# Patient Record
Sex: FEMALE | Race: WHITE | NOT HISPANIC OR LATINO | Employment: OTHER | URBAN - METROPOLITAN AREA
[De-identification: names, ages, dates, MRNs, and addresses within clinical notes are randomized per-mention and may not be internally consistent; named-entity substitution may affect disease eponyms.]

---

## 2017-01-11 ENCOUNTER — OFFICE VISIT (OUTPATIENT)
Dept: OPHTHALMOLOGY | Facility: CLINIC | Age: 82
End: 2017-01-11
Payer: MEDICARE

## 2017-01-11 DIAGNOSIS — H40.1131 PRIMARY OPEN ANGLE GLAUCOMA OF BOTH EYES, MILD STAGE: Primary | ICD-10-CM

## 2017-01-11 DIAGNOSIS — H50.22 HYPERTROPIA OF LEFT EYE: ICD-10-CM

## 2017-01-11 DIAGNOSIS — H25.13 NUCLEAR SCLEROSIS, BILATERAL: ICD-10-CM

## 2017-01-11 DIAGNOSIS — H50.011 ESOTROPIA OF RIGHT EYE: ICD-10-CM

## 2017-01-11 PROCEDURE — 99999 PR PBB SHADOW E&M-EST. PATIENT-LVL I: CPT | Mod: PBBFAC,,, | Performed by: OPHTHALMOLOGY

## 2017-01-11 PROCEDURE — 92012 INTRM OPH EXAM EST PATIENT: CPT | Mod: S$GLB,,, | Performed by: OPHTHALMOLOGY

## 2017-01-11 NOTE — PROGRESS NOTES
HPI     2 month IOP check.      COAG   NSC OU  DRY EYE   STRABISMUS WITH LASER  H/O RET HOLE OD 3/07  Pt. Sees better at distance with new glasses, but likes older glasses for   near.      Combigan BID OU  LATANOPROST qd OU  PF systane 4-6 times daily OD  Tara 128 ointment at bed OD       Last edited by Virginie Klein on 1/11/2017 11:54 AM.         Assessment /Plan     For exam results, see Encounter Report.      ICD-10-CM ICD-9-CM    1. Primary open angle glaucoma of both eyes, mild stage H40.1131 365.11 IOP Stable GOCT Reviewed today  Will cont current treatment     365.71    2. Nuclear sclerosis, bilateral H25.13 366.16 You were found to have visually significant cataract(s) today and it is our opinion that you would benefit from cataract surgery. However, you have chosen to not have surgery at this time.  If you change your mind or if the symptoms worsen, please give us a call for an additional appointment.      3. Esotropia of right eye H50.00 378.00    4. Hypertropia of left eye H50.22 378.31        Return to clinic in 4 Months Dilation,HVF,SDP

## 2017-05-03 ENCOUNTER — OFFICE VISIT (OUTPATIENT)
Dept: OPHTHALMOLOGY | Facility: CLINIC | Age: 82
End: 2017-05-03
Payer: MEDICARE

## 2017-05-03 DIAGNOSIS — H50.22 HYPERTROPIA OF LEFT EYE: ICD-10-CM

## 2017-05-03 DIAGNOSIS — H40.1131 PRIMARY OPEN ANGLE GLAUCOMA OF BOTH EYES, MILD STAGE: Primary | ICD-10-CM

## 2017-05-03 DIAGNOSIS — H25.13 NUCLEAR SCLEROSIS, BILATERAL: ICD-10-CM

## 2017-05-03 DIAGNOSIS — H50.011 ESOTROPIA OF RIGHT EYE: ICD-10-CM

## 2017-05-03 DIAGNOSIS — H04.123 DRY EYES, BILATERAL: ICD-10-CM

## 2017-05-03 PROCEDURE — 92250 FUNDUS PHOTOGRAPHY W/I&R: CPT | Mod: S$GLB,,, | Performed by: OPHTHALMOLOGY

## 2017-05-03 PROCEDURE — 99999 PR PBB SHADOW E&M-EST. PATIENT-LVL I: CPT | Mod: PBBFAC,,, | Performed by: OPHTHALMOLOGY

## 2017-05-03 PROCEDURE — 92014 COMPRE OPH EXAM EST PT 1/>: CPT | Mod: S$GLB,,, | Performed by: OPHTHALMOLOGY

## 2017-05-03 NOTE — PROGRESS NOTES
HPI     Glaucoma    Additional comments: Combigan bid ou, latanoprost qhs ou           Comments   Patient is here  For a 3 month dilated exam w/ SDP'S, patient states she   rarely misses dosing of drops 98% compliant, patient  got a new glass rx   from Dr. Lee around February and is doing ok .         Last edited by Jonathan Sanches MD on 5/3/2017 10:47 AM. (History)            Assessment /Plan     For exam results, see Encounter Report.      ICD-10-CM ICD-9-CM    1. Primary open angle glaucoma of both eyes, mild stage H40.1131 365.11 Color Fundus Photography - OU - Both Eyes Done today   Doing well - intraocular pressure is within acceptable range relative to target pressure with no evidence of progression.   Continue current treatment.  Reviewed importance of continued compliance with treatment and follow up.        365.71    2. Nuclear sclerosis, bilateral H25.13 366.16 Appears stable- Follow    3. Esotropia of right eye H50.00 378.00 Followed by Dr. Lee    4. 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 Dry eyes, bilateral H04.123 375.15 Stable, Follow        Combigan bid ou, latanoprost qhs ou   RETURN TO CLINIC 4 month IOP, HVF and GOCT

## 2017-09-20 ENCOUNTER — OFFICE VISIT (OUTPATIENT)
Dept: OPHTHALMOLOGY | Facility: CLINIC | Age: 82
End: 2017-09-20
Payer: MEDICARE

## 2017-09-20 DIAGNOSIS — H25.13 NUCLEAR SCLEROSIS, BILATERAL: ICD-10-CM

## 2017-09-20 DIAGNOSIS — H40.1131 PRIMARY OPEN ANGLE GLAUCOMA OF BOTH EYES, MILD STAGE: Primary | ICD-10-CM

## 2017-09-20 DIAGNOSIS — H04.123 DRY EYE SYNDROME, BILATERAL: ICD-10-CM

## 2017-09-20 PROCEDURE — 99999 PR PBB SHADOW E&M-EST. PATIENT-LVL I: CPT | Mod: PBBFAC,,, | Performed by: OPHTHALMOLOGY

## 2017-09-20 PROCEDURE — 92083 EXTENDED VISUAL FIELD XM: CPT | Mod: S$GLB,,, | Performed by: OPHTHALMOLOGY

## 2017-09-20 PROCEDURE — 92012 INTRM OPH EXAM EST PATIENT: CPT | Mod: S$GLB,,, | Performed by: OPHTHALMOLOGY

## 2017-09-20 PROCEDURE — 92133 CPTRZD OPH DX IMG PST SGM ON: CPT | Mod: S$GLB,,, | Performed by: OPHTHALMOLOGY

## 2017-09-20 NOTE — PROGRESS NOTES
HPI     Glaucoma    Additional comments: Combigan BID OU and Latanoprost ou qhs, PF Systane   prn OU and Refresh ointment qhs OU           Comments   4 month glaucoma check (IOP check and review GOCT and HVF)    no eye pain  Patient states that her left eye started tearing occasionally since her   last visit.    COAG   NSC OU  DRY EYE   STRABISMUS WITH LASER  H/O RET HOLE OD 3/07    Combigan BID OU  LATANOPROST qd OU  OU PF systane 4-6 times daily , Refresh ointment at bedtime       Last edited by Safia Valencia on 9/20/2017 10:29 AM. (History)            Assessment /Plan     For exam results, see Encounter Report.      ICD-10-CM ICD-9-CM    1. Primary open angle glaucoma of both eyes, mild stage H40.1131 365.11 Posterior Segment OCT Optic Nerve- Both eyes     365.71 Carrera Visual Field - OU - Extended - Both Eyes    Doing well - intraocular pressure is within acceptable range relative to target pressure with no evidence of progression.   Continue current treatment.  Reviewed importance of continued compliance with treatment and follow up.       GOCT appeared a little worse but all other factors stable   2. Nuclear sclerosis, bilateral H25.13 366.16    3. Dry eye syndrome, bilateral H04.123 375.15    Combigan BID OU  LATANOPROST qd OU  OU PF systane 4-6 times daily     Return to clinic 4 months iop check

## 2018-01-05 DIAGNOSIS — H40.1131 PRIMARY OPEN ANGLE GLAUCOMA OF BOTH EYES, MILD STAGE: ICD-10-CM

## 2018-01-05 RX ORDER — LATANOPROST 50 UG/ML
SOLUTION/ DROPS OPHTHALMIC
Qty: 3 BOTTLE | Refills: 4 | Status: SHIPPED | OUTPATIENT
Start: 2018-01-05 | End: 2019-05-03 | Stop reason: SDUPTHER

## 2018-01-30 ENCOUNTER — OFFICE VISIT (OUTPATIENT)
Dept: OPHTHALMOLOGY | Facility: CLINIC | Age: 83
End: 2018-01-30
Payer: MEDICARE

## 2018-01-30 DIAGNOSIS — H25.13 NUCLEAR SCLEROSIS, BILATERAL: ICD-10-CM

## 2018-01-30 DIAGNOSIS — H50.011 ESOTROPIA OF RIGHT EYE: ICD-10-CM

## 2018-01-30 DIAGNOSIS — H40.1131 PRIMARY OPEN ANGLE GLAUCOMA OF BOTH EYES, MILD STAGE: Primary | ICD-10-CM

## 2018-01-30 DIAGNOSIS — H04.123 DRY EYE SYNDROME, BILATERAL: ICD-10-CM

## 2018-01-30 DIAGNOSIS — H50.22 HYPERTROPIA OF LEFT EYE: ICD-10-CM

## 2018-01-30 PROCEDURE — 92012 INTRM OPH EXAM EST PATIENT: CPT | Mod: S$GLB,,, | Performed by: OPHTHALMOLOGY

## 2018-01-30 PROCEDURE — 99999 PR PBB SHADOW E&M-EST. PATIENT-LVL II: CPT | Mod: PBBFAC,,, | Performed by: OPHTHALMOLOGY

## 2018-01-30 NOTE — PROGRESS NOTES
HPI     4 month IOP check.  No change in vision per pt.  No other complaints.    COAG   NSC OU  DRY EYE   STRABISMUS WITH LASER  H/O RET HOLE OD 3/07  Here for 4 month IOP check.    Combigan BID OU  LATANOPROST qd OU (uses at lunch)  OU PF systane 4-6 times daily , Refresh ointment at bedtime      Last edited by Virginie Klein on 1/30/2018 11:45 AM. (History)            Assessment /Plan     For exam results, see Encounter Report.      ICD-10-CM ICD-9-CM    1. Primary open angle glaucoma of both eyes, mild stage H40.1131 365.11 Doing well - intraocular pressure is within acceptable range relative to target pressure with no evidence of progression.   Continue current treatment.  Reviewed importance of continued compliance with treatment and follow up.        365.71    2. Nuclear sclerosis, bilateral H25.13 366.16   You were found to have an early cataract in your eye(s) today, however the cataract is not affecting your activities of daily living, such as reading and driving.You do not need  surgery at this time. We will recheck your cataract at your next visit. You are welcome to call for an earlier appointment if your vision gets worse.      3. Dry eye syndrome, bilateral H04.123 375.15 Stable on Systane and gel - follow    4. Esotropia of right eye H50.00 378.00 Follow    5. Hypertropia of left eye H50.22 378.31 Follow          Combigan BID OU  LATANOPROST qd OU (uses at lunch)  OU PF systane 4-6 times daily , Refresh ointment at bedtime    RETURN TO CLINIC 4 months DOA and SDP

## 2018-05-22 DIAGNOSIS — H40.1131 PRIMARY OPEN ANGLE GLAUCOMA OF BOTH EYES, MILD STAGE: ICD-10-CM

## 2018-05-22 RX ORDER — BRIMONIDINE TARTRATE AND TIMOLOL MALEATE 2; 5 MG/ML; MG/ML
1 SOLUTION OPHTHALMIC 2 TIMES DAILY
Qty: 5 ML | Refills: 6 | Status: SHIPPED | OUTPATIENT
Start: 2018-05-22 | End: 2020-12-09

## 2018-05-22 NOTE — TELEPHONE ENCOUNTER
Patient upset she never got her refill on Combigan. Did not want us to send to a local pharmacy. Called [facility] to get those refills for her. Stated we no longer provide samples. Patient states she'll just go with out drops. Called [facility] to rush order.

## 2018-05-22 NOTE — TELEPHONE ENCOUNTER
----- Message from Cheyenne Frazier sent at 5/22/2018  8:40 AM CDT -----  Contact: Pt  Please give pt a call at .[phone] (home) pt wouldn't give detail of the call but states that its urgent.

## 2018-06-06 ENCOUNTER — OFFICE VISIT (OUTPATIENT)
Dept: OPHTHALMOLOGY | Facility: CLINIC | Age: 83
End: 2018-06-06
Payer: MEDICARE

## 2018-06-06 DIAGNOSIS — H04.123 DRY EYE SYNDROME, BILATERAL: ICD-10-CM

## 2018-06-06 DIAGNOSIS — H40.1131 PRIMARY OPEN ANGLE GLAUCOMA OF BOTH EYES, MILD STAGE: Primary | ICD-10-CM

## 2018-06-06 DIAGNOSIS — H25.12 NUCLEAR SENILE CATARACT OF LEFT EYE: ICD-10-CM

## 2018-06-06 DIAGNOSIS — H50.011 ESOTROPIA OF RIGHT EYE: ICD-10-CM

## 2018-06-06 DIAGNOSIS — H50.22 HYPERTROPIA OF LEFT EYE: ICD-10-CM

## 2018-06-06 DIAGNOSIS — H25.11 SENILE NUCLEAR SCLEROSIS, RIGHT: ICD-10-CM

## 2018-06-06 PROCEDURE — 92250 FUNDUS PHOTOGRAPHY W/I&R: CPT | Mod: S$GLB,,, | Performed by: OPHTHALMOLOGY

## 2018-06-06 PROCEDURE — 92014 COMPRE OPH EXAM EST PT 1/>: CPT | Mod: S$GLB,,, | Performed by: OPHTHALMOLOGY

## 2018-06-06 PROCEDURE — 99999 PR PBB SHADOW E&M-EST. PATIENT-LVL II: CPT | Mod: PBBFAC,,, | Performed by: OPHTHALMOLOGY

## 2018-06-06 NOTE — PROGRESS NOTES
HPI     Glaucoma    Additional comments: combigan bid ou, latanoprost qhs ou           Comments   Patient returns for a 4 month iop check, dilation and sdp's patient states   her vision is blurry when watching tv but not when driving, patient states   she is 100% compliant with drop usage, patient states that vision is OS    Is getting dim.        COAG   NSC OU  DRY EYE   STRABISMUS WITH LASER  H/O RET HOLE OD 3/07    Combigan BID OU  LATANOPROST qd OU  OU PF systane 4-6 times daily , Refresh ointment at bedtime ( has not been   using )        COAG   NSC OU  DRY EYE   STRABISMUS WITH LASER  H/O RET HOLE OD 3/07    Combigan BID OU  LATANOPROST qd OU  OU PF systane 4-6 times daily , Refresh ointment at bedtime ( has not been   using )       Last edited by WM Chadwick on 6/6/2018  8:45 AM. (History)            Assessment /Plan     For exam results, see Encounter Report.      ICD-10-CM ICD-9-CM    1. Primary open angle glaucoma of both eyes, mild stage H40.1131 365.11 Color Fundus Photography - OU - Both Eyes    Doing well - intraocular pressure is within acceptable range relative to target pressure with no evidence of progression.   Continue current treatment.  Reviewed importance of continued compliance with treatment and follow up.       Consider phaco/ab internal canal OS     365.71    2. Dry eye syndrome, bilateral H04.123 375.15 Well. Continue current treatment    3. Esotropia of right eye H50.00 378.00 Stable. Follow    4. 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 Follow    5. Nuclear senile cataract of left eye H25.12 366.16 Come back for cataract pre-op next visit. Will be block   6. 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 Will do orders next visit.

## 2018-07-11 ENCOUNTER — OFFICE VISIT (OUTPATIENT)
Dept: OPHTHALMOLOGY | Facility: CLINIC | Age: 83
End: 2018-07-11
Payer: MEDICARE

## 2018-07-11 DIAGNOSIS — H25.12 NUCLEAR SENILE CATARACT OF LEFT EYE: ICD-10-CM

## 2018-07-11 DIAGNOSIS — H50.011 ESOTROPIA OF RIGHT EYE: ICD-10-CM

## 2018-07-11 DIAGNOSIS — H25.11 SENILE NUCLEAR SCLEROSIS, RIGHT: ICD-10-CM

## 2018-07-11 DIAGNOSIS — H04.123 DRY EYE SYNDROME, BILATERAL: ICD-10-CM

## 2018-07-11 DIAGNOSIS — H50.22 HYPERTROPIA OF LEFT EYE: ICD-10-CM

## 2018-07-11 DIAGNOSIS — H40.1131 PRIMARY OPEN ANGLE GLAUCOMA OF BOTH EYES, MILD STAGE: Primary | ICD-10-CM

## 2018-07-11 PROCEDURE — 92136 OPHTHALMIC BIOMETRY: CPT | Mod: LT,S$GLB,, | Performed by: OPHTHALMOLOGY

## 2018-07-11 PROCEDURE — 99499 UNLISTED E&M SERVICE: CPT | Mod: S$GLB,,, | Performed by: OPHTHALMOLOGY

## 2018-07-11 PROCEDURE — 99999 PR PBB SHADOW E&M-EST. PATIENT-LVL II: CPT | Mod: PBBFAC,,, | Performed by: OPHTHALMOLOGY

## 2018-07-11 RX ORDER — POLYMYXIN B SULFATE AND TRIMETHOPRIM 1; 10000 MG/ML; [USP'U]/ML
1 SOLUTION OPHTHALMIC EVERY 4 HOURS
Qty: 1 BOTTLE | Refills: 1 | Status: SHIPPED | OUTPATIENT
Start: 2018-07-11 | End: 2018-07-21

## 2018-07-11 RX ORDER — KETOROLAC TROMETHAMINE 5 MG/ML
1 SOLUTION OPHTHALMIC 4 TIMES DAILY
Qty: 1 BOTTLE | Refills: 3 | Status: SHIPPED | OUTPATIENT
Start: 2018-07-11 | End: 2018-07-25

## 2018-07-11 RX ORDER — DIFLUPREDNATE OPHTHALMIC 0.5 MG/ML
1 EMULSION OPHTHALMIC 4 TIMES DAILY
Qty: 1 BOTTLE | Refills: 1 | Status: SHIPPED | OUTPATIENT
Start: 2018-07-11 | End: 2018-08-10

## 2018-07-11 NOTE — PROGRESS NOTES
HPI     Patient returns for a one month ascan,and  Topog, patient needs to see   Esmer.      COAG   NSC OU  DRY EYE   STRABISMUS WITH LASER  H/O RET HOLE OD 3/07    Combigan BID OU  LATANOPROST qd OU  OU PF systane 4-6 times daily , Refresh ointment at bedtime ( has not been   using )          COAG   NSC OU  DRY EYE   STRABISMUS WITH LASER  H/O RET HOLE OD 3/07    Combigan BID OU  LATANOPROST qd OU  OU PF systane 4-6 times daily , Refresh ointment at bedtime ( has not been   using )    Last edited by WM Chadwick on 7/11/2018  9:14 AM. (History)            Assessment /Plan     For exam results, see Encounter Report.      ICD-10-CM ICD-9-CM    1. Primary open angle glaucoma of both eyes, mild stage H40.1131 365.11 Discussed with patient treatment options for glaucoma in conjunction with cataract surgery. Patient is currently treating glaucoma with topical medications and reports significant complaints regarding the tolerability of the medications with respect to cost and irritation. Specifically, the patient complains of redness, irritation, chronic discomfort as well as a financial burden associated with the use of glaucoma medications. Discussed treatment options including ECP, filtering procedures, iStent, canaloplasty, Goniotomy or the continued use of glaucoma medications. Patient desires the undergo Ab- CP in conjunction with cataract surgery in order to reduce dependence of glaucoma medications.           365.71    2. Nuclear senile cataract of left eye H25.12 366.16 Visually Significant Cataract OS > OD  Patient reports decreased vision consistent with the clinical amount of lenticular opacity, which reaches the level of visual significance and affects activities of daily living including reading and glare. Risks, benefits, and alternatives to cataract surgery were discussed.   The pt expressed a desire to proceed with surgery with the potential for some reasonable degree of visual  improvement.    Discussed IOL options and refractive outcomes for this patient.    Phaco left eye Toric  with Ab-Internal Canaloplasty , NO ORA  Block  toric IOL.  Will aim for distance  Referral to [facility] for Ophthalmic [facility]  Prescriptions sent for preoperative medications  Durezol, Polytrim, and Ketorolac  Explained that patient may need glasses after surgery.  Discussed that vision may be limited by retina/ strabismus      KRISTINE with pt that she will still need glasses after sx due to diplopia and wears prisms      3. Senile nuclear sclerosis, right H25.11 366.16 See above    4. Dry eye syndrome, bilateral H04.123 375.15    5. Hypertropia of left eye H50.22 378.31 Follow    6. Esotropia of right eye H50.00 378.00 Follow

## 2018-07-25 ENCOUNTER — TELEPHONE (OUTPATIENT)
Dept: OPHTHALMOLOGY | Facility: CLINIC | Age: 83
End: 2018-07-25

## 2018-07-25 NOTE — TELEPHONE ENCOUNTER
----- Message from Bailey Guzman sent at 7/25/2018  1:30 PM CDT -----  Contact: pt  Please call pt regarding procedure tomorrow, pt states no one call to confirm date and time, please call pt @ [phone]

## 2018-07-25 NOTE — TELEPHONE ENCOUNTER
----- Message from Bailey Guzman sent at 7/25/2018  1:30 PM CDT -----  Contact: pt  Please call pt regarding procedure tomorrow, pt states no one call to confirm date and time, please call pt @ [phone]

## 2018-07-25 NOTE — TELEPHONE ENCOUNTER
----- Message from Fabienne Valdes sent at 7/25/2018  7:42 AM CDT -----  Contact: pt  Patient needs to cancel her surgery because she was not taking the medication prescribed for 14 days, please call her back at [phone]. Thank you

## 2018-07-27 ENCOUNTER — OFFICE VISIT (OUTPATIENT)
Dept: OPHTHALMOLOGY | Facility: CLINIC | Age: 83
End: 2018-07-27
Payer: MEDICARE

## 2018-07-27 DIAGNOSIS — Z98.42 CATARACT EXTRACTION STATUS, LEFT: ICD-10-CM

## 2018-07-27 DIAGNOSIS — Z98.890 POST-OPERATIVE STATE: Primary | ICD-10-CM

## 2018-07-27 DIAGNOSIS — H40.1131 PRIMARY OPEN ANGLE GLAUCOMA OF BOTH EYES, MILD STAGE: ICD-10-CM

## 2018-07-27 PROCEDURE — 99999 PR PBB SHADOW E&M-EST. PATIENT-LVL I: CPT | Mod: PBBFAC,,, | Performed by: OPHTHALMOLOGY

## 2018-07-27 PROCEDURE — 99024 POSTOP FOLLOW-UP VISIT: CPT | Mod: S$GLB,,, | Performed by: OPHTHALMOLOGY

## 2018-07-27 PROCEDURE — 99499 UNLISTED E&M SERVICE: CPT | Mod: ,,, | Performed by: OPHTHALMOLOGY

## 2018-07-27 RX ORDER — KETOROLAC TROMETHAMINE 5 MG/ML
1 SOLUTION OPHTHALMIC 4 TIMES DAILY
COMMUNITY
End: 2018-08-01

## 2018-07-27 RX ORDER — POLYMYXIN B SULFATE AND TRIMETHOPRIM 1; 10000 MG/ML; [USP'U]/ML
1 SOLUTION OPHTHALMIC 4 TIMES DAILY
COMMUNITY
End: 2018-08-22

## 2018-07-27 NOTE — PROGRESS NOTES
HPI     Patient states 0/10 on pain scale.        COAG   PCIOL OS TORIC 07/26/18  DRY EYE   STRABISMUS WITH LASER  H/O RET HOLE OD 3/07    Combigan BID OU  LATANOPROST qd OU  OU PF systane 4-6 times daily , Refresh ointment at bedtime ( has not been   using )      OS DUREZOL QID, KET QID, POLYTRIM QID    Last edited by WM Chadwick on 7/27/2018  1:21 PM. (History)            Assessment /Plan     For exam results, see Encounter Report.      ICD-10-CM ICD-9-CM    1. Post-operative state Z98.890 V45.89    2. Cataract extraction status, left Z98.42 V45.61    3. Primary open angle glaucoma of both eyes, mild stage H40.1131 365.11      365.71        PO Day 1 S/P Phaco/IOL with Ab-internal Cp left eye  Doing well.    Use Durezol QID  Stop Ketorolac qid due to burning.  Polymyxin B 4 x day  Reinstructed in importance of absolute compliance with Post-OP instructions including medications, shield at bedtime, and limitation of activities. Follow up appointments in approximately one and six weeks or call immediately for increased pain, redness or vision loss.

## 2018-08-01 ENCOUNTER — OFFICE VISIT (OUTPATIENT)
Dept: OPHTHALMOLOGY | Facility: CLINIC | Age: 83
End: 2018-08-01
Payer: MEDICARE

## 2018-08-01 DIAGNOSIS — H40.1131 PRIMARY OPEN ANGLE GLAUCOMA OF BOTH EYES, MILD STAGE: ICD-10-CM

## 2018-08-01 DIAGNOSIS — H04.123 DRY EYE SYNDROME, BILATERAL: ICD-10-CM

## 2018-08-01 DIAGNOSIS — Z98.42 CATARACT EXTRACTION STATUS, LEFT: ICD-10-CM

## 2018-08-01 DIAGNOSIS — Z98.890 POST-OPERATIVE STATE: Primary | ICD-10-CM

## 2018-08-01 PROCEDURE — 99999 PR PBB SHADOW E&M-EST. PATIENT-LVL II: CPT | Mod: PBBFAC,,, | Performed by: OPHTHALMOLOGY

## 2018-08-01 PROCEDURE — 99024 POSTOP FOLLOW-UP VISIT: CPT | Mod: S$GLB,,, | Performed by: OPHTHALMOLOGY

## 2018-08-01 RX ORDER — LEVOTHYROXINE SODIUM 25 UG/1
TABLET ORAL
COMMUNITY
Start: 2018-07-17

## 2018-08-22 ENCOUNTER — OFFICE VISIT (OUTPATIENT)
Dept: OPHTHALMOLOGY | Facility: CLINIC | Age: 83
End: 2018-08-22
Payer: MEDICARE

## 2018-08-22 DIAGNOSIS — H50.011 ESOTROPIA OF RIGHT EYE: ICD-10-CM

## 2018-08-22 DIAGNOSIS — H25.11 SENILE NUCLEAR SCLEROSIS, RIGHT: ICD-10-CM

## 2018-08-22 DIAGNOSIS — Z98.42 CATARACT EXTRACTION STATUS, LEFT: Primary | ICD-10-CM

## 2018-08-22 DIAGNOSIS — H50.22 HYPERTROPIA OF LEFT EYE: ICD-10-CM

## 2018-08-22 PROCEDURE — 99024 POSTOP FOLLOW-UP VISIT: CPT | Mod: S$GLB,,, | Performed by: OPHTHALMOLOGY

## 2018-08-22 PROCEDURE — 99999 PR PBB SHADOW E&M-EST. PATIENT-LVL I: CPT | Mod: PBBFAC,,, | Performed by: OPHTHALMOLOGY

## 2018-08-22 RX ORDER — DIFLUPREDNATE OPHTHALMIC 0.5 MG/ML
1 EMULSION OPHTHALMIC 3 TIMES DAILY
COMMUNITY
End: 2019-03-27

## 2018-08-22 NOTE — PROGRESS NOTES
HPI     Patient returns for a 3 week p.o. Vision patient states her vision is   good.  COAG   PCIOL /ab internal CP OS+20.5 SN6AT6 / CDE10.06 / 07/26/18  DRY EYE   STRABISMUS WITH LASER  H/O RET HOLE OD 3/07    Combigan BID OU  LATANOPROST qd OU  OU PF systane 4-6 times daily , Refresh ointment at bedtime ( has not been   using )      OS DUREZOL QID (PATIENT STATES TID IF SHES CUONG)        Last edited by Jonathan Sanches MD on 8/22/2018  2:18 PM. (History)            Assessment /Plan     For exam results, see Encounter Report.      ICD-10-CM ICD-9-CM    1. Cataract extraction status, left Z98.42 V45.61 Doing well   Steroid BID x 8/30 then QD x 9/7 then stop    2. Senile nuclear sclerosis, right H25.11 366.16 Pt desires to follow and defers sx at this time    3. Esotropia of right eye H50.00 378.00 Stable with prism - alna with pt re: surgery and she defers at this time   Saw Dr Lee in the past   Pt wants to follow with prism only at this time and knows that we are limited to improving va based on esotropia/ hypertropia    4. 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

## 2018-09-20 ENCOUNTER — TELEPHONE (OUTPATIENT)
Dept: OPHTHALMOLOGY | Facility: CLINIC | Age: 83
End: 2018-09-20

## 2018-09-20 NOTE — TELEPHONE ENCOUNTER
----- Message from Sherley Valdes sent at 9/20/2018  3:53 PM CDT -----  Contact: pt  The pt request a return call, no additional info given, can be reached at [phone]///thxMW

## 2018-09-25 ENCOUNTER — TELEPHONE (OUTPATIENT)
Dept: OPHTHALMOLOGY | Facility: CLINIC | Age: 83
End: 2018-09-25

## 2018-09-25 NOTE — TELEPHONE ENCOUNTER
Spoke with pt.  Advised that  recommends [employer] as Omega-3 supplement.  She may purchase them at [employer].

## 2018-11-28 ENCOUNTER — OFFICE VISIT (OUTPATIENT)
Dept: OPHTHALMOLOGY | Facility: CLINIC | Age: 83
End: 2018-11-28
Payer: MEDICARE

## 2018-11-28 DIAGNOSIS — H50.22 HYPERTROPIA OF LEFT EYE: ICD-10-CM

## 2018-11-28 DIAGNOSIS — H50.011 ESOTROPIA OF RIGHT EYE: ICD-10-CM

## 2018-11-28 DIAGNOSIS — H40.1131 PRIMARY OPEN ANGLE GLAUCOMA OF BOTH EYES, MILD STAGE: Primary | ICD-10-CM

## 2018-11-28 DIAGNOSIS — H04.123 DRY EYE SYNDROME, BILATERAL: ICD-10-CM

## 2018-11-28 DIAGNOSIS — H25.11 SENILE NUCLEAR SCLEROSIS, RIGHT: ICD-10-CM

## 2018-11-28 DIAGNOSIS — Z98.42 CATARACT EXTRACTION STATUS, LEFT: ICD-10-CM

## 2018-11-28 PROCEDURE — 92012 INTRM OPH EXAM EST PATIENT: CPT | Mod: S$GLB,,, | Performed by: OPHTHALMOLOGY

## 2018-11-28 PROCEDURE — 99999 PR PBB SHADOW E&M-EST. PATIENT-LVL II: CPT | Mod: PBBFAC,,, | Performed by: OPHTHALMOLOGY

## 2018-11-28 NOTE — PROGRESS NOTES
HPI     Glaucoma      Additional comments: 3 month IOP check              Comments     The patient states her eyes are doing well and she denies any ocular   pain.  100% drop compliance    1. COAG   2. PCIOL /ab internal CP OS+20.5 SN6AT6 / CDE10.06 / 07/26/18  3. CATS OD  4. DRY EYE   STRABISMUS WITH LASER  5. 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 Esotropia OD  7. Hypertropia OS    Combigan BID OU  LATANOPROST qd OU  OU PF systane 4-6 times daily , Refresh ointment at bedtime ( has not been   using )            Last edited by Leatha Elliott on 11/28/2018  1:38 PM. (History)            Assessment /Plan     For exam results, see Encounter Report.      ICD-10-CM ICD-9-CM    1. Primary open angle glaucoma of both eyes, mild stage H40.1131 365.11 Doing well - intraocular pressure is within acceptable range relative to target pressure with no evidence of progression.   Continue current treatment.  Reviewed importance of continued compliance with treatment and follow up.        365.71    2. Senile nuclear sclerosis, right H25.11 366.16   You were found to have an early cataract in your eye(s) today, however the cataract is not affecting your activities of daily living, such as reading and driving.You do not need  surgery at this time. We will recheck your cataract at your next visit. You are welcome to call for an earlier appointment if your vision gets worse.      3. Cataract extraction status, left Z98.42 V45.61 Stable.   4. Dry eye syndrome, bilateral H04.123 375.15 Well    5. Hypertropia of left eye H50.22 378.31 Stable, follow    6. Esotropia of right eye H50.00 378.00 Stable, follow      Combigan BID OU  LATANOPROST qd OU  Return to clinic 4 months with HVF, GOCT, and IOP check

## 2019-03-27 ENCOUNTER — OFFICE VISIT (OUTPATIENT)
Dept: OPHTHALMOLOGY | Facility: CLINIC | Age: 84
End: 2019-03-27
Payer: MEDICARE

## 2019-03-27 DIAGNOSIS — H40.1131 PRIMARY OPEN ANGLE GLAUCOMA OF BOTH EYES, MILD STAGE: Primary | ICD-10-CM

## 2019-03-27 DIAGNOSIS — Z98.42 CATARACT EXTRACTION STATUS, LEFT: ICD-10-CM

## 2019-03-27 DIAGNOSIS — H25.11 SENILE NUCLEAR SCLEROSIS, RIGHT: ICD-10-CM

## 2019-03-27 DIAGNOSIS — H50.011 ESOTROPIA OF RIGHT EYE: ICD-10-CM

## 2019-03-27 DIAGNOSIS — H04.123 DRY EYE SYNDROME, BILATERAL: ICD-10-CM

## 2019-03-27 PROCEDURE — 92012 PR EYE EXAM, EST PATIENT,INTERMED: ICD-10-PCS | Mod: S$GLB,,, | Performed by: OPHTHALMOLOGY

## 2019-03-27 PROCEDURE — 92133 POSTERIOR SEGMENT OCT OPTIC NERVE(OCULAR COHERENCE TOMOGRAPHY) - OU - BOTH EYES: ICD-10-PCS | Mod: S$GLB,,, | Performed by: OPHTHALMOLOGY

## 2019-03-27 PROCEDURE — 99999 PR PBB SHADOW E&M-EST. PATIENT-LVL II: CPT | Mod: PBBFAC,,, | Performed by: OPHTHALMOLOGY

## 2019-03-27 PROCEDURE — 92083 EXTENDED VISUAL FIELD XM: CPT | Mod: S$GLB,,, | Performed by: OPHTHALMOLOGY

## 2019-03-27 PROCEDURE — 92012 INTRM OPH EXAM EST PATIENT: CPT | Mod: S$GLB,,, | Performed by: OPHTHALMOLOGY

## 2019-03-27 PROCEDURE — 99999 PR PBB SHADOW E&M-EST. PATIENT-LVL II: ICD-10-PCS | Mod: PBBFAC,,, | Performed by: OPHTHALMOLOGY

## 2019-03-27 PROCEDURE — 92083 HUMPHREY VISUAL FIELD - OU - BOTH EYES: ICD-10-PCS | Mod: S$GLB,,, | Performed by: OPHTHALMOLOGY

## 2019-03-27 PROCEDURE — 92133 CPTRZD OPH DX IMG PST SGM ON: CPT | Mod: S$GLB,,, | Performed by: OPHTHALMOLOGY

## 2019-03-27 RX ORDER — LIOTHYRONINE SODIUM 5 UG/1
25 TABLET ORAL DAILY
COMMUNITY

## 2019-03-27 RX ORDER — KETOROLAC TROMETHAMINE 5 MG/ML
1 SOLUTION OPHTHALMIC 4 TIMES DAILY
Qty: 1 BOTTLE | Refills: 0 | Status: SHIPPED | OUTPATIENT
Start: 2019-03-27 | End: 2019-04-03

## 2019-03-27 NOTE — PROGRESS NOTES
HPI     Patient returns for a 4 month hvf review, goct and iop check,patient   states she misses her drop dosing weekly but cant tell me which one.      PCP DR. WOOD        1. COAG   2. PCIOL /ab internal CP OS+20.5 SN6AT6 / CDE10.06 / 07/26/18  3. CATS OD  4. DRY EYE   STRABISMUS WITH LASER  5. 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 Esotropia OD  7. Hypertropia OS    Combigan BID OU  LATANOPROST qd OU  OU PF systane 4-6 times daily , Refresh ointment at bedtime ( has not been   using )      Last edited by Jonathan Sanches MD on 3/27/2019 11:39 AM. (History)            Assessment /Plan     For exam results, see Encounter Report.      ICD-10-CM ICD-9-CM    1. Primary open angle glaucoma of both eyes, mild stage H40.1131 365.11 Posterior Segment OCT Optic Nerve- Both eyes     365.71 Carrera Visual Field - OU - Extended - Both Eyes      Would like IOP lower , pt defers phaco/ cp now od- will book SLT OD    2. Cataract extraction status, left Z98.42 V45.61    3. Senile nuclear sclerosis, right H25.11 366.16 Follow at pt's reqt   4. Esotropia of right eye H50.00 378.00 Follow    5. Dry eye syndrome, bilateral H04.123 375.15 Follow        Book SLT OD     Combigan BID OU  LATANOPROST qd OU  OU PF systane 4-6 times daily , Refresh ointment at bedtime

## 2019-04-23 ENCOUNTER — OFFICE VISIT (OUTPATIENT)
Dept: OPHTHALMOLOGY | Facility: CLINIC | Age: 84
End: 2019-04-23
Payer: MEDICARE

## 2019-04-23 DIAGNOSIS — H40.1131 PRIMARY OPEN ANGLE GLAUCOMA OF BOTH EYES, MILD STAGE: ICD-10-CM

## 2019-04-23 DIAGNOSIS — Z98.42 CATARACT EXTRACTION STATUS, LEFT: ICD-10-CM

## 2019-04-23 DIAGNOSIS — H04.123 DRY EYES, BILATERAL: Primary | ICD-10-CM

## 2019-04-23 DIAGNOSIS — H25.11 SENILE NUCLEAR SCLEROSIS, RIGHT: ICD-10-CM

## 2019-04-23 PROCEDURE — 92012 INTRM OPH EXAM EST PATIENT: CPT | Mod: S$GLB,,, | Performed by: OPHTHALMOLOGY

## 2019-04-23 PROCEDURE — 99999 PR PBB SHADOW E&M-EST. PATIENT-LVL II: CPT | Mod: PBBFAC,,, | Performed by: OPHTHALMOLOGY

## 2019-04-23 PROCEDURE — 99999 PR PBB SHADOW E&M-EST. PATIENT-LVL II: ICD-10-PCS | Mod: PBBFAC,,, | Performed by: OPHTHALMOLOGY

## 2019-04-23 PROCEDURE — 92012 PR EYE EXAM, EST PATIENT,INTERMED: ICD-10-PCS | Mod: S$GLB,,, | Performed by: OPHTHALMOLOGY

## 2019-04-23 NOTE — PROGRESS NOTES
"HPI     Eye Problem      Additional comments: Eye pain OD              Comments     The patient states at the end of the day starting Saturday she has had   pain in the top part of her right eye "scratching" that hurts worse when   she blinks. The patient states she had to put an eye patch on because it   hurt so bad to blink. The patient rates her pain a 5-6 on a scale of 1-10.    PCP DR. WOOD    1. COAG   SLT OD 4-11-19  2. PCIOL /ab internal CP OS+20.5 SN6AT6 / CDE10.06 / 07/26/18  3. CATS OD  4. DRY EYE   STRABISMUS WITH LASER  5. 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 Esotropia OD  7. Hypertropia OS    Combigan BID OU  LATANOPROST QD OU  OU PF systane 4-6 times daily , Refresh ointment at bedtime          Last edited by Jonathan Sanches MD on 4/23/2019  4:22 PM. (History)            Assessment /Plan     For exam results, see Encounter Report.    ICD-10-CM ICD-9-CM   1. Dry eyes, bilateral     Significant today , start oasis tears  H04.123 375.15   2. Primary open angle glaucoma of both eyes, mild stage- iop deferred today due to severe dryness  H40.1131 365.11     365.71   3. Cataract extraction status, left Z98.42 V45.61   4. Senile nuclear sclerosis, right H25.11 366.16         Add OASIS 4-6 times daily OD   Combigan BID OU  LATANOPROST QD OU  OU PF systane 4-6 times daily , Refresh ointment at bedtime       Cancel appt next week and move to 3-4 weeks , will landon IOP then         "

## 2019-05-03 DIAGNOSIS — H40.1131 PRIMARY OPEN ANGLE GLAUCOMA OF BOTH EYES, MILD STAGE: ICD-10-CM

## 2019-05-03 RX ORDER — LATANOPROST 50 UG/ML
SOLUTION/ DROPS OPHTHALMIC
Qty: 3 BOTTLE | Refills: 4 | Status: SHIPPED | OUTPATIENT
Start: 2019-05-03 | End: 2020-07-02

## 2019-05-21 ENCOUNTER — OFFICE VISIT (OUTPATIENT)
Dept: OPHTHALMOLOGY | Facility: CLINIC | Age: 84
End: 2019-05-21
Payer: MEDICARE

## 2019-05-21 DIAGNOSIS — Z98.42 CATARACT EXTRACTION STATUS, LEFT: ICD-10-CM

## 2019-05-21 DIAGNOSIS — H40.1131 PRIMARY OPEN ANGLE GLAUCOMA OF BOTH EYES, MILD STAGE: Primary | ICD-10-CM

## 2019-05-21 DIAGNOSIS — H25.11 SENILE NUCLEAR SCLEROSIS, RIGHT: ICD-10-CM

## 2019-05-21 DIAGNOSIS — H50.011 ESOTROPIA OF RIGHT EYE: ICD-10-CM

## 2019-05-21 DIAGNOSIS — H04.123 DRY EYES, BILATERAL: ICD-10-CM

## 2019-05-21 PROCEDURE — 92012 PR EYE EXAM, EST PATIENT,INTERMED: ICD-10-PCS | Mod: S$GLB,,, | Performed by: OPHTHALMOLOGY

## 2019-05-21 PROCEDURE — 92012 INTRM OPH EXAM EST PATIENT: CPT | Mod: S$GLB,,, | Performed by: OPHTHALMOLOGY

## 2019-05-21 PROCEDURE — 99999 PR PBB SHADOW E&M-EST. PATIENT-LVL I: CPT | Mod: PBBFAC,,, | Performed by: OPHTHALMOLOGY

## 2019-05-21 PROCEDURE — 99999 PR PBB SHADOW E&M-EST. PATIENT-LVL I: ICD-10-PCS | Mod: PBBFAC,,, | Performed by: OPHTHALMOLOGY

## 2019-05-21 NOTE — PROGRESS NOTES
HPI     Glaucoma      Additional comments: IOP check, Combgian BID OU, Latanoprost QHS OU              Dry Eye      Additional comments: Oasis 4-5x daily OU              Comments     Pt states the Oasis took about 3-4 days to take effect but doing really   well with her dryness. Using her glaucoma drops as directed.     PCP DR. WOOD        1. COAG   SLT OD 4-11-19  2. PCIOL /ab internal CP OS+20.5 SN6AT6 / CDE10.06 / 07/26/18  3. CATS OD  4. DRY EYE   STRABISMUS WITH LASER  5. 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 Esotropia OD  7. Hypertropia OS    Combigan BID OU  LATANOPROST qd OU  OU PF systane 4-6 times daily , Refresh ointment at bedtime, Oasis 4-6x   daily OD          Last edited by Jakob Delong on 5/21/2019  2:13 PM. (History)            Assessment /Plan     For exam results, see Encounter Report.      ICD-10-CM ICD-9-CM    1. Primary open angle glaucoma of both eyes, mild stage H40.1131 365.11 Doing well - intraocular pressure is within acceptable range relative to target pressure with no evidence of progression.   Continue current treatment.  Reviewed importance of continued compliance with treatment and follow up.        365.71    2. Cataract extraction status, left Z98.42 V45.61 Follow    3. Senile nuclear sclerosis, right H25.11 366.16 Follow    4. Dry eyes, bilateral H04.123 375.15 Much improved today    5. Esotropia of right eye H50.00 378.00        RETURN TO CLINIC 4 months DOA, SDP    Combigan BID OU  LATANOPROST qd OU  OU PF systane 4-6 times daily , Refresh ointment at bedtime, Oasis 4-6x   daily OD

## 2019-09-25 ENCOUNTER — OFFICE VISIT (OUTPATIENT)
Dept: OPHTHALMOLOGY | Facility: CLINIC | Age: 84
End: 2019-09-25
Payer: MEDICARE

## 2019-09-25 DIAGNOSIS — Z98.42 CATARACT EXTRACTION STATUS, LEFT: ICD-10-CM

## 2019-09-25 DIAGNOSIS — H40.1132 PRIMARY OPEN ANGLE GLAUCOMA (POAG) OF BOTH EYES, MODERATE STAGE: Primary | ICD-10-CM

## 2019-09-25 DIAGNOSIS — H50.22 HYPERTROPIA OF LEFT EYE: ICD-10-CM

## 2019-09-25 DIAGNOSIS — H25.11 SENILE NUCLEAR SCLEROSIS, RIGHT: ICD-10-CM

## 2019-09-25 DIAGNOSIS — H04.123 DRY EYES, BILATERAL: ICD-10-CM

## 2019-09-25 DIAGNOSIS — H50.011 ESOTROPIA OF RIGHT EYE: ICD-10-CM

## 2019-09-25 PROCEDURE — 99999 PR PBB SHADOW E&M-EST. PATIENT-LVL II: CPT | Mod: PBBFAC,,, | Performed by: OPHTHALMOLOGY

## 2019-09-25 PROCEDURE — 92250 COLOR FUNDUS PHOTOGRAPHY - OU - BOTH EYES: ICD-10-PCS | Mod: S$GLB,,, | Performed by: OPHTHALMOLOGY

## 2019-09-25 PROCEDURE — 92014 PR EYE EXAM, EST PATIENT,COMPREHESV: ICD-10-PCS | Mod: S$GLB,,, | Performed by: OPHTHALMOLOGY

## 2019-09-25 PROCEDURE — 99999 PR PBB SHADOW E&M-EST. PATIENT-LVL II: ICD-10-PCS | Mod: PBBFAC,,, | Performed by: OPHTHALMOLOGY

## 2019-09-25 PROCEDURE — 92250 FUNDUS PHOTOGRAPHY W/I&R: CPT | Mod: S$GLB,,, | Performed by: OPHTHALMOLOGY

## 2019-09-25 PROCEDURE — 92014 COMPRE OPH EXAM EST PT 1/>: CPT | Mod: S$GLB,,, | Performed by: OPHTHALMOLOGY

## 2019-09-25 NOTE — PROGRESS NOTES
HPI     Glaucoma      Additional comments: Combigan bid ou, Latanoprost qhs ou              Comments     Patient returns for a 4 month iop check, dilation w/ sdp. Patient states   she is 100% compliant with drop usage.        PCP DR. WOOD        1. COAG   SLT OD 4-11-19  2. PCIOL /ab internal CP OS+20.5 SN6AT6 / CDE10.06 / 07/26/18  CATS OD  3. DRY EYE   4. STRABISMUS WITH LASER  5. 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 Esotropia OD  7. Hypertropia OS    Combigan BID OU  LATANOPROST qd OU  OU PF systane 4-6 times daily , Refresh ointment at bedtime, Oasis 4-6x   daily OD          Last edited by Jonathan Sanches MD on 9/25/2019  4:41 PM. (History)            Assessment /Plan     For exam results, see Encounter Report.      ICD-10-CM ICD-9-CM    1. Primary open angle glaucoma (POAG) of both eyes, moderate stage H40.1132 365.11 Color Fundus Photography - OU - Both Eyes   question of increased thinning out temp rim OU     365.72    2. Cataract extraction status, left Z98.42 V45.61    3. Senile nuclear sclerosis, right H25.11 366.16 Desires to follow   4. Dry eyes, bilateral H04.123 375.15 improved   5. Esotropia of right eye H50.00 378.00 diplopia controlled in glasses    6. 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

## 2019-10-09 ENCOUNTER — OFFICE VISIT (OUTPATIENT)
Dept: OPHTHALMOLOGY | Facility: CLINIC | Age: 84
End: 2019-10-09
Payer: MEDICARE

## 2019-10-09 DIAGNOSIS — H25.11 SENILE NUCLEAR SCLEROSIS, RIGHT: ICD-10-CM

## 2019-10-09 DIAGNOSIS — Z98.42 CATARACT EXTRACTION STATUS, LEFT: ICD-10-CM

## 2019-10-09 DIAGNOSIS — H04.123 DRY EYES, BILATERAL: ICD-10-CM

## 2019-10-09 DIAGNOSIS — H40.1132 PRIMARY OPEN ANGLE GLAUCOMA (POAG) OF BOTH EYES, MODERATE STAGE: Primary | ICD-10-CM

## 2019-10-09 PROCEDURE — 99999 PR PBB SHADOW E&M-EST. PATIENT-LVL II: CPT | Mod: PBBFAC,,, | Performed by: OPHTHALMOLOGY

## 2019-10-09 PROCEDURE — 92012 PR EYE EXAM, EST PATIENT,INTERMED: ICD-10-PCS | Mod: S$GLB,,, | Performed by: OPHTHALMOLOGY

## 2019-10-09 PROCEDURE — 92012 INTRM OPH EXAM EST PATIENT: CPT | Mod: S$GLB,,, | Performed by: OPHTHALMOLOGY

## 2019-10-09 PROCEDURE — 99999 PR PBB SHADOW E&M-EST. PATIENT-LVL II: ICD-10-PCS | Mod: PBBFAC,,, | Performed by: OPHTHALMOLOGY

## 2019-10-09 RX ORDER — TRAZODONE HYDROCHLORIDE 150 MG/1
TABLET ORAL
COMMUNITY
Start: 2019-05-24

## 2019-10-09 NOTE — PROGRESS NOTES
"HPI     Glaucoma      Additional comments: 2 Week IOP Check              Comments     Patient feels OD "feels irritated" prior to starting the new drop, having   a constant FB sensation way at the top of OD and symptoms worsen while   blinking, since starting Rhopressa OU appears red constantly but states it   is nothing she can't get over.        1. COAG   SLT OD 4-11-19  2. PCIOL /ab internal CP OS+20.5 SN6AT6 / CDE10.06 / 07/26/18  CATS OD  3. DRY EYE   4. STRABISMUS WITH LASER  5. 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 Esotropia OD  7. Hypertropia OS    Combigan BID OU  LATANOPROST qd OU  Rhopressa QD OU  OU PF systane 4-6 times daily , Refresh ointment at bedtime, Oasis 4-6x   daily OD          Last edited by Erika Mcfarland, Patient Care Assistant on 10/9/2019  8:24   AM. (History)            Assessment /Plan     For exam results, see Encounter Report.      ICD-10-CM ICD-9-CM    1. Primary open angle glaucoma (POAG) of both eyes, moderate stage H40.1132 365.11 Will continue rhopressa, sent to  pharmacy today for PA      365.72    2. Cataract extraction status, left Z98.42 V45.61 Well    3. Senile nuclear sclerosis, right H25.11 366.16   You were found to have an early cataract in your eye(s) today, however the cataract is not affecting your activities of daily living, such as reading and driving.You do not need  surgery at this time. We will recheck your cataract at your next visit. You are welcome to call for an earlier appointment if your vision gets worse.      4. Dry eyes, bilateral H04.123 375.15 Well, continue current treatment      Combigan BID OU  LATANOPROST qd OU  Rhopressa QD OU       Return to clinic 2-3 months with IOP check               "

## 2019-10-16 ENCOUNTER — TELEPHONE (OUTPATIENT)
Dept: PHARMACY | Facility: CLINIC | Age: 84
End: 2019-10-16

## 2019-10-16 NOTE — TELEPHONE ENCOUNTER
Spoke w/ Pt notifying her Rhopressa PA approved resulting in $90.00 copayment for 3 bottles, her request.    Pt would like medication shipped to her home.  Shipping address verified, billing information obtained.    PA Information:  [employer]  [phone]  PA Case ID: [id number]   PA Approval Dates: 10/16/19-12/31/2020  PA approval dates: 10/16/2019-12/31/2020  PA Approved for Rhopressa  0.02% #2.5mL per 20 days    Thank You!   Hilary Garrido CPhT, B.A  Patient Care Advocate   [facility]  [email or web address]  Phone: [phone] Ext [phone]  Fax: [phone]

## 2020-01-06 ENCOUNTER — TELEPHONE (OUTPATIENT)
Dept: OPHTHALMOLOGY | Facility: CLINIC | Age: 85
End: 2020-01-06

## 2020-01-06 NOTE — TELEPHONE ENCOUNTER
----- Message from Fabienne Valdes sent at 1/6/2020  9:06 AM CST -----  Contact: pt  Patient needs call back rg medication pls advise ..[phone] (home

## 2020-01-06 NOTE — TELEPHONE ENCOUNTER
Spoke with pt.  She is still having pinkness in both eyes since starting Rhopressa.  Recently had blood red eye OD.  She states she was told by Dr. Sanches that the redness would resolve in 2-3 weeks.  She isn't happy with the pink appearance of her eyes.  Will discuss with Dr. Sanches and call pt back.  Her next appointment with him is next month.

## 2020-01-10 ENCOUNTER — OFFICE VISIT (OUTPATIENT)
Dept: OPHTHALMOLOGY | Facility: CLINIC | Age: 85
End: 2020-01-10
Payer: MEDICARE

## 2020-01-10 DIAGNOSIS — Z98.42 CATARACT EXTRACTION STATUS, LEFT: ICD-10-CM

## 2020-01-10 DIAGNOSIS — H40.1132 PRIMARY OPEN ANGLE GLAUCOMA (POAG) OF BOTH EYES, MODERATE STAGE: Primary | ICD-10-CM

## 2020-01-10 DIAGNOSIS — H25.11 SENILE NUCLEAR SCLEROSIS, RIGHT: ICD-10-CM

## 2020-01-10 DIAGNOSIS — H04.123 DRY EYES, BILATERAL: ICD-10-CM

## 2020-01-10 PROCEDURE — 92012 INTRM OPH EXAM EST PATIENT: CPT | Mod: S$GLB,,, | Performed by: OPHTHALMOLOGY

## 2020-01-10 PROCEDURE — 92012 PR EYE EXAM, EST PATIENT,INTERMED: ICD-10-PCS | Mod: S$GLB,,, | Performed by: OPHTHALMOLOGY

## 2020-01-10 PROCEDURE — 99999 PR PBB SHADOW E&M-EST. PATIENT-LVL II: CPT | Mod: PBBFAC,,, | Performed by: OPHTHALMOLOGY

## 2020-01-10 PROCEDURE — 99999 PR PBB SHADOW E&M-EST. PATIENT-LVL II: ICD-10-PCS | Mod: PBBFAC,,, | Performed by: OPHTHALMOLOGY

## 2020-01-10 RX ORDER — PANTOPRAZOLE SODIUM 40 MG/1
TABLET, DELAYED RELEASE ORAL
COMMUNITY
Start: 2019-12-10

## 2020-01-10 NOTE — PROGRESS NOTES
HPI     Glaucoma      Additional comments: 2-3 mth IOP check               Comments     1. COAG   SLT OD 4-11-19  2. PCIOL /ab internal CP OS+20.5 SN6AT6 / CDE10.06 / 07/26/18  CATS OD  3. DRY EYE   4. STRABISMUS WITH LASER  5. 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 Esotropia OD  7. Hypertropia OS    Combigan BID OU  LATANOPROST qd OU  Rhopressa QD OU (pt d/c due to excess redness, inside of lids got red,   never cleared)  OU PF systane 4-6 times daily , Refresh ointment at bedtime, Oasis 4-6x   daily OD          Last edited by Brigid Wise MA on 1/10/2020  8:11 AM. (History)            Assessment /Plan     For exam results, see Encounter Report.      ICD-10-CM ICD-9-CM    1. Primary open angle glaucoma (POAG) of both eyes, moderate stage H40.1132 365.11 Good IOP OS, borderline IOP OD. Consider phaco OD with ab internal CP. Will return for ascan and dilation      365.72    2. Senile nuclear sclerosis, right H25.11 366.16 Pt desires to wait another month before surgery    3. Cataract extraction status, left Z98.42 V45.61    4. Dry eyes, bilateral H04.123 375.15 Well, continue current drops      Combigan BID OU  LATANOPROST qd OU  Return to clinic 2 weeks for ascan, dilation, preop dilation for phaco/ab internal CP OD

## 2020-01-22 ENCOUNTER — OFFICE VISIT (OUTPATIENT)
Dept: OPHTHALMOLOGY | Facility: CLINIC | Age: 85
End: 2020-01-22
Payer: MEDICARE

## 2020-01-22 DIAGNOSIS — H25.11 SENILE NUCLEAR SCLEROSIS, RIGHT: Primary | ICD-10-CM

## 2020-01-22 DIAGNOSIS — H50.011 ESOTROPIA OF RIGHT EYE: ICD-10-CM

## 2020-01-22 DIAGNOSIS — H40.1132 PRIMARY OPEN ANGLE GLAUCOMA (POAG) OF BOTH EYES, MODERATE STAGE: ICD-10-CM

## 2020-01-22 DIAGNOSIS — Z98.42 CATARACT EXTRACTION STATUS, LEFT: ICD-10-CM

## 2020-01-22 DIAGNOSIS — H50.22 HYPERTROPIA OF LEFT EYE: ICD-10-CM

## 2020-01-22 DIAGNOSIS — H04.123 DRY EYE SYNDROME, BILATERAL: ICD-10-CM

## 2020-01-22 PROCEDURE — 92014 COMPRE OPH EXAM EST PT 1/>: CPT | Mod: S$GLB,,, | Performed by: OPHTHALMOLOGY

## 2020-01-22 PROCEDURE — 99999 PR PBB SHADOW E&M-EST. PATIENT-LVL III: ICD-10-PCS | Mod: PBBFAC,,, | Performed by: OPHTHALMOLOGY

## 2020-01-22 PROCEDURE — 92136 OPHTHALMIC BIOMETRY: CPT | Mod: 26,RT,S$GLB, | Performed by: OPHTHALMOLOGY

## 2020-01-22 PROCEDURE — 92136 IOL MASTER - OD - RIGHT EYE: ICD-10-PCS | Mod: 26,RT,S$GLB, | Performed by: OPHTHALMOLOGY

## 2020-01-22 PROCEDURE — 99999 PR PBB SHADOW E&M-EST. PATIENT-LVL III: CPT | Mod: PBBFAC,,, | Performed by: OPHTHALMOLOGY

## 2020-01-22 PROCEDURE — 92014 PR EYE EXAM, EST PATIENT,COMPREHESV: ICD-10-PCS | Mod: S$GLB,,, | Performed by: OPHTHALMOLOGY

## 2020-01-22 RX ORDER — KETOROLAC TROMETHAMINE 5 MG/ML
1 SOLUTION OPHTHALMIC 4 TIMES DAILY
Qty: 5 ML | Refills: 0 | Status: SHIPPED | OUTPATIENT
Start: 2020-01-22 | End: 2021-01-21

## 2020-01-22 RX ORDER — DIFLUPREDNATE OPHTHALMIC 0.5 MG/ML
1 EMULSION OPHTHALMIC 4 TIMES DAILY
Qty: 1 BOTTLE | Refills: 1 | Status: SHIPPED | OUTPATIENT
Start: 2020-01-22 | End: 2020-02-21

## 2020-01-22 RX ORDER — POLYMYXIN B SULFATE AND TRIMETHOPRIM 1; 10000 MG/ML; [USP'U]/ML
1 SOLUTION OPHTHALMIC EVERY 4 HOURS
Qty: 1 BOTTLE | Refills: 1 | Status: SHIPPED | OUTPATIENT
Start: 2020-01-22 | End: 2020-01-27

## 2020-01-22 NOTE — PROGRESS NOTES
HPI     Cataract      Additional comments: Catasract evaluation              Comments     Patient feels OU is doing well, VA appears to be dull and cloudy in OD   compared to OS along with sensitivity to glare in bright sunlight.    PCP DR. WOOD        1. COAG   SLT OD 4-11-19  2. PCIOL /ab internal CP OS+20.5 SN6AT6 / CDE10.06 / 07/26/18  CATS OD  3. DRY EYE   4. STRABISMUS WITH LASER  5. 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 Esotropia OD  7. Hypertropia OS    Combigan BID OU  LATANOPROST qd OU  Rhopressa QD OU (pt d/c due to excess redness, inside of lids got red,   never cleared)  OU PF systane 4-6 times daily , Refresh ointment at bedtime, Oasis 4-6x   daily OD          Last edited by Erika Mcfarland, Patient Care Assistant on 1/22/2020  8:28   AM. (History)            Assessment /Plan     For exam results, see Encounter Report.      ICD-10-CM ICD-9-CM    1. Senile nuclear sclerosis, right H25.11 366.16 Visually Significant Cataract OD  Patient reports decreased vision consistent with the clinical amount of lenticular opacity, which reaches the level of visual significance and affects activities of daily living including reading and glare. Risks, benefits, and alternatives to cataract surgery were discussed.   The pt expressed a desire to proceed with surgery with the potential for some reasonable degree of visual improvement.    Discussed IOL options and refractive outcomes for this patient.    Phaco right eye with Goniotomy TORIC   BLOCK   monofocal IOL.  Will aim for distance  Referral to [facility] for Ophthalmic surgery  Prescriptions sent for preoperative medications  Durezol, polytrim and ketorolac  Explained that patient may need glasses after surgery.  Discussed that vision may be limited by retina, h/o hole od,           2. Primary open angle glaucoma (POAG) of both eyes, moderate stage H40.1132 365.11 Uncontrolled glaucoma OD on maximum tolerated therapy: Significant risk of continued irreversible  glaucomatous vision loss with current level of treatment. Therefore surgical options were reviewed at great length with the pt and GONIOTOMY  recommended in hopes of reducing the IOP to a more tolerable level. A lengthy discussion of the risks, benefits and alternatives was presented to the pt including balancing the immediate risks of vision loss from surgery vs the likelihood of continued vision loss from the inadequately controlled glaucoma. Also discussed the need for frequent, careful follow-up exams for monitoring and other possible postop adjustments.        365.72    3. Cataract extraction status, left Z98.42 V45.61    4. Esotropia of right eye H50.00 378.00    5. Hypertropia of left eye H50.22 378.31 KRISTINE with pt that she will still need prism after sx    6. Dry eye syndrome, bilateral H04.123 375.15 continue tears ou        H/o Retina Hole OD w/ laser repair , well sealed and wnl moct done today     Combigan BID OU  LATANOPROST qd OU

## 2020-01-30 ENCOUNTER — OUTSIDE PLACE OF SERVICE (OUTPATIENT)
Dept: ADMINISTRATIVE | Facility: OTHER | Age: 85
End: 2020-01-30

## 2020-01-30 ENCOUNTER — OFFICE VISIT (OUTPATIENT)
Dept: OPHTHALMOLOGY | Facility: CLINIC | Age: 85
End: 2020-01-30
Payer: MEDICARE

## 2020-01-30 DIAGNOSIS — Z98.41 CATARACT EXTRACTION STATUS, RIGHT: ICD-10-CM

## 2020-01-30 DIAGNOSIS — H40.1132 PRIMARY OPEN ANGLE GLAUCOMA (POAG) OF BOTH EYES, MODERATE STAGE: ICD-10-CM

## 2020-01-30 DIAGNOSIS — Z98.890 POST-OPERATIVE STATE: Primary | ICD-10-CM

## 2020-01-30 PROCEDURE — 99999 PR PBB SHADOW E&M-EST. PATIENT-LVL II: CPT | Mod: PBBFAC,,, | Performed by: OPHTHALMOLOGY

## 2020-01-30 PROCEDURE — 99499 PR MULTI-FOCAL IOL EVAL: ICD-10-PCS | Mod: CSM,RT,, | Performed by: OPHTHALMOLOGY

## 2020-01-30 PROCEDURE — 65820 PR RELIEVE INNER EYE PRESSURE: ICD-10-PCS | Mod: RT,,, | Performed by: OPHTHALMOLOGY

## 2020-01-30 PROCEDURE — 99024 PR POST-OP FOLLOW-UP VISIT: ICD-10-PCS | Mod: S$GLB,,, | Performed by: OPHTHALMOLOGY

## 2020-01-30 PROCEDURE — 99999 PR PBB SHADOW E&M-EST. PATIENT-LVL II: ICD-10-PCS | Mod: PBBFAC,,, | Performed by: OPHTHALMOLOGY

## 2020-01-30 PROCEDURE — 66984 XCAPSL CTRC RMVL W/O ECP: CPT | Mod: 51,RT,, | Performed by: OPHTHALMOLOGY

## 2020-01-30 PROCEDURE — 66984 PR REMOVAL, CATARACT, W/INSRT INTRAOC LENS, W/O ENDO CYCLO: ICD-10-PCS | Mod: 51,RT,, | Performed by: OPHTHALMOLOGY

## 2020-01-30 PROCEDURE — 65820 GONIOTOMY: CPT | Mod: RT,,, | Performed by: OPHTHALMOLOGY

## 2020-01-30 PROCEDURE — 99024 POSTOP FOLLOW-UP VISIT: CPT | Mod: S$GLB,,, | Performed by: OPHTHALMOLOGY

## 2020-01-30 PROCEDURE — 99499 UNLISTED E&M SERVICE: CPT | Mod: CSM,RT,, | Performed by: OPHTHALMOLOGY

## 2020-01-30 NOTE — PROGRESS NOTES
"HPI     Post-op Evaluation      Additional comments: same day Recheck IOL OD/ Goniotomy/  Toric / Block              Comments     Pt states that OD is "terribly uncomfortable"      Combigan BID OU  LATANOPROST qd OU  Rhopressa QD OU (pt d/c due to excess redness, inside of lids got red,   never cleared)  OU PF systane 4-6 times daily , Refresh ointment at bedtime, Oasis 4-6x   daily OD      PCP DR. MAIN        1. COAG   SLT OD 4-11-19  2. PCIOL /ab internal CP OS+20.5 SN6AT6 / CDE10.06 / 07/26/18      PCIOL / GONIOTOMY/ TORIC/ BLOCK OD 1/30/2020    3. DRY EYE   4. STRABISMUS WITH LASER  5. 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 Esotropia OD  7. Hypertropia OS    Combigan BID OU  LATANOPROST qd OU  Rhopressa QD OU (pt d/c due to excess redness, inside of lids got red,   never cleared)  OU PF systane 4-6 times daily , Refresh ointment at bedtime, Oasis 4-6x   daily OD          Last edited by Nelly Allison on 1/30/2020  1:12 PM. (History)            Assessment /Plan     For exam results, see Encounter Report.      ICD-10-CM ICD-9-CM    1. Post-operative state Z98.890 V45.89    2. Cataract extraction status, right Z98.41 V45.61    3. Primary open angle glaucoma (POAG) of both eyes, moderate stage H40.1132 365.11      365.72        PO Same Day 1 S/P Phaco/IOL with goniotomy  right eye TORIC  Doing well.    Use Durezol , POLY, KETO QID OD  NO GLAUCOMA MEDS OD    Reinstructed in importance of absolute compliance with Post-OP instructions including medications, shield at bedtime, and limitation of activities. Follow up appointments in approximately one and six weeks or call immediately for increased pain, redness or vision loss.                       "

## 2020-02-03 ENCOUNTER — OFFICE VISIT (OUTPATIENT)
Dept: OPHTHALMOLOGY | Facility: CLINIC | Age: 85
End: 2020-02-03
Payer: MEDICARE

## 2020-02-03 DIAGNOSIS — Z98.890 POST-OPERATIVE STATE: Primary | ICD-10-CM

## 2020-02-03 DIAGNOSIS — H40.1132 PRIMARY OPEN ANGLE GLAUCOMA (POAG) OF BOTH EYES, MODERATE STAGE: ICD-10-CM

## 2020-02-03 DIAGNOSIS — Z98.41 CATARACT EXTRACTION STATUS, RIGHT: ICD-10-CM

## 2020-02-03 PROCEDURE — 99024 POSTOP FOLLOW-UP VISIT: CPT | Mod: S$GLB,,, | Performed by: OPHTHALMOLOGY

## 2020-02-03 PROCEDURE — 99999 PR PBB SHADOW E&M-EST. PATIENT-LVL I: CPT | Mod: PBBFAC,,, | Performed by: OPHTHALMOLOGY

## 2020-02-03 PROCEDURE — 99024 PR POST-OP FOLLOW-UP VISIT: ICD-10-PCS | Mod: S$GLB,,, | Performed by: OPHTHALMOLOGY

## 2020-02-03 PROCEDURE — 99999 PR PBB SHADOW E&M-EST. PATIENT-LVL I: ICD-10-PCS | Mod: PBBFAC,,, | Performed by: OPHTHALMOLOGY

## 2020-02-03 RX ORDER — ACETAZOLAMIDE 500 MG/1
500 CAPSULE, EXTENDED RELEASE ORAL 2 TIMES DAILY
Qty: 6 CAPSULE | Refills: 0 | Status: SHIPPED | OUTPATIENT
Start: 2020-02-03 | End: 2020-02-07 | Stop reason: SDUPTHER

## 2020-02-03 NOTE — PROGRESS NOTES
HPI     The patient is here for a 4day PO for PCIOL/ Goniotomy OD. The patient   states her right eye is not doing well as it is blurry and it does ache at   times.    PCP DR. WOOD      1. COAG   SLT OD 4-11-19  2. PCIOL /ab internal CP OS+20.5 SN6AT6 / CDE10.06 / 07/26/18  PCIOL / Goniotomy OD +19.5 SN6AT6 /CDE 10.07/1/30/2020  3. DRY EYE   4. STRABISMUS WITH LASER  5. 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 Esotropia OD    *Intolerant to Rhopressa    OS- Combigan BID, LATANOPROST QD  OD- Durezol QID, Keto QID, Poly QID  OU PF systane 4-6 times daily , Refresh ointment at bedtime, Oasis 4-6x   daily OD    Last edited by Leatha Elliott on 2/3/2020 10:10 AM. (History)            Assessment /Plan     For exam results, see Encounter Report.      ICD-10-CM ICD-9-CM    1. Post-operative state Z98.890 V45.89 S/p phaco/goniotomy OD 1/30/20. IOP elevated. Add diamox and resume coag meds    2. Cataract extraction status, right Z98.41 V45.61    3. Primary open angle glaucoma (POAG) of both eyes, moderate stage H40.1132 365.11      365.72        Use combigan BID and latanoprost qd OU  Add diamox 500 po BID for 3 days     D/c poly and keto   OD: durezol qid,

## 2020-02-04 ENCOUNTER — OFFICE VISIT (OUTPATIENT)
Dept: OPHTHALMOLOGY | Facility: CLINIC | Age: 85
End: 2020-02-04
Payer: MEDICARE

## 2020-02-04 DIAGNOSIS — Z98.41 CATARACT EXTRACTION STATUS, RIGHT: ICD-10-CM

## 2020-02-04 DIAGNOSIS — H40.1132 PRIMARY OPEN ANGLE GLAUCOMA (POAG) OF BOTH EYES, MODERATE STAGE: ICD-10-CM

## 2020-02-04 DIAGNOSIS — Z98.890 POST-OPERATIVE STATE: Primary | ICD-10-CM

## 2020-02-04 PROCEDURE — 99024 PR POST-OP FOLLOW-UP VISIT: ICD-10-PCS | Mod: S$GLB,,, | Performed by: OPHTHALMOLOGY

## 2020-02-04 PROCEDURE — 99024 POSTOP FOLLOW-UP VISIT: CPT | Mod: S$GLB,,, | Performed by: OPHTHALMOLOGY

## 2020-02-04 PROCEDURE — 99999 PR PBB SHADOW E&M-EST. PATIENT-LVL II: ICD-10-PCS | Mod: PBBFAC,,, | Performed by: OPHTHALMOLOGY

## 2020-02-04 PROCEDURE — 99999 PR PBB SHADOW E&M-EST. PATIENT-LVL II: CPT | Mod: PBBFAC,,, | Performed by: OPHTHALMOLOGY

## 2020-02-04 NOTE — PROGRESS NOTES
HPI     Patient was seen yesterday and was started on Diamox Bid p.o.patient   states occasional tingling in hands since starting Diamox yesterday ,   patient resumed  her Combigan bid in OU and Latanprost qhs ou.    1. COAG   SLT OD 4-11-19  2. PCIOL /ab internal CP OS+20.5 SN6AT6 / CDE10.06 / 07/26/18  PCIOL / Goniotomy OD +19.5 SN6AT6 /CDE 10.07/1/30/2020  3. DRY EYE   4. STRABISMUS WITH LASER  5. 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 Esotropia OD    *Intolerant to Rhopressa    OU- Combigan BID, LATANOPROST QD   OD- Durezol QID,  DIAMOX BID P.O.  OU PF systane 4-6 times daily , Refresh ointment at bedtime, Oasis 4-6x   daily OD    Last edited by Jonathan Sanches MD on 2/4/2020  3:12 PM. (History)            Assessment /Plan     For exam results, see Encounter Report.      ICD-10-CM ICD-9-CM    1. Post-operative state Z98.890 V45.89 S/p phaco/goniotomy OD 1/30/20. IOP better today since starting diamox.    2. Cataract extraction status, right Z98.41 V45.61    3. Primary open angle glaucoma (POAG) of both eyes, moderate stage H40.1132 365.11      365.72        Reduce diamox to qam for 3 days   OU- Combigan BID, LATANOPROST QD   OD- Durezol QID,  DIAMOX qam P.O.  Return to clinic 1 week

## 2020-02-07 ENCOUNTER — TELEPHONE (OUTPATIENT)
Dept: OPHTHALMOLOGY | Facility: CLINIC | Age: 85
End: 2020-02-07

## 2020-02-07 ENCOUNTER — OFFICE VISIT (OUTPATIENT)
Dept: OPHTHALMOLOGY | Facility: CLINIC | Age: 85
End: 2020-02-07
Payer: MEDICARE

## 2020-02-07 DIAGNOSIS — Z98.890 POST-OPERATIVE STATE: Primary | ICD-10-CM

## 2020-02-07 DIAGNOSIS — H40.1132 PRIMARY OPEN ANGLE GLAUCOMA (POAG) OF BOTH EYES, MODERATE STAGE: ICD-10-CM

## 2020-02-07 PROCEDURE — 99999 PR PBB SHADOW E&M-EST. PATIENT-LVL III: CPT | Mod: PBBFAC,,, | Performed by: OPHTHALMOLOGY

## 2020-02-07 PROCEDURE — 99024 PR POST-OP FOLLOW-UP VISIT: ICD-10-PCS | Mod: S$GLB,,, | Performed by: OPHTHALMOLOGY

## 2020-02-07 PROCEDURE — 99999 PR PBB SHADOW E&M-EST. PATIENT-LVL III: ICD-10-PCS | Mod: PBBFAC,,, | Performed by: OPHTHALMOLOGY

## 2020-02-07 PROCEDURE — 99024 POSTOP FOLLOW-UP VISIT: CPT | Mod: S$GLB,,, | Performed by: OPHTHALMOLOGY

## 2020-02-07 RX ORDER — ACETAZOLAMIDE 500 MG/1
500 CAPSULE, EXTENDED RELEASE ORAL DAILY
Qty: 15 CAPSULE | Refills: 1 | Status: SHIPPED | OUTPATIENT
Start: 2020-02-07 | End: 2020-03-18 | Stop reason: ALTCHOICE

## 2020-02-07 NOTE — TELEPHONE ENCOUNTER
----- Message from Arlen So sent at 2/7/2020 10:01 AM CST -----  Contact: pt  .Type:  Needs Medical Advice    Who Called: pt  Symptoms (please be specific):  Eye cloudy   How long has patient had these symptoms:   today  Pharmacy name and phone #:  .    [id number] - [place] - [address]  [address] [place] [address]  Phone: [phone] Fax: [phone]    Would the patient rather a call back or a response via [facility]?  Call back   Best Call Back Number:  [phone] (home)   Additional Information:  Pt stated she's out of medication

## 2020-02-07 NOTE — PROGRESS NOTES
HPI     Patient  Returns for a 3 day recheck, patient states her vision was   blurry in OD this am upon awakening   but is now cleared.Patient took her   last Diamox this am.Patient states  her memory is fading and states she   can't remember simple things.          PCP DR. WOOD    1. COAG   SLT OD 4-11-19  2. PCIOL /ab internal CP OS+20.5 SN6AT6 / CDE10.06 / 07/26/18  PCIOL / Goniotomy OD +19.5 SN6AT6 /CDE 10.07/1/30/2020  3. DRY EYE   4. STRABISMUS WITH LASER  5. 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 Esotropia OD    *Intolerant to Rhopressa    OU- Combigan BID, LATANOPROST QD   OD- Durezol QID,  DIAMOX QD P.O.( finished on 02/07/20 )    OU PF systane 4-6 times daily , Refresh ointment at bedtime, Oasis 4-6x   daily OD          Last edited by Chiquita Dorantes on 2/7/2020 11:35 AM. (History)            Assessment /Plan     For exam results, see Encounter Report.      ICD-10-CM ICD-9-CM    1. Post-operative state Z98.890 V45.89    2. Primary open angle glaucoma (POAG) of both eyes, moderate stage H40.1132 365.11 acetaZOLAMIDE (DIAMOX SEQUELS) 500 mg CpSR     365.72      PCIOL / Goniotomy OD +19.5 SN6AT6 /CDE 10.07/1/30/2020        OU- Combigan BID, LATANOPROST QD   OD- Durezol QID,  DIAMOX QD P.O    Return to clinic 1 week

## 2020-02-12 ENCOUNTER — OFFICE VISIT (OUTPATIENT)
Dept: OPHTHALMOLOGY | Facility: CLINIC | Age: 85
End: 2020-02-12
Payer: MEDICARE

## 2020-02-12 DIAGNOSIS — H40.1132 PRIMARY OPEN ANGLE GLAUCOMA (POAG) OF BOTH EYES, MODERATE STAGE: ICD-10-CM

## 2020-02-12 DIAGNOSIS — Z98.890 POST-OPERATIVE STATE: Primary | ICD-10-CM

## 2020-02-12 DIAGNOSIS — Z98.41 CATARACT EXTRACTION STATUS, RIGHT: ICD-10-CM

## 2020-02-12 PROCEDURE — 99999 PR PBB SHADOW E&M-EST. PATIENT-LVL II: CPT | Mod: PBBFAC,,, | Performed by: OPHTHALMOLOGY

## 2020-02-12 PROCEDURE — 99024 PR POST-OP FOLLOW-UP VISIT: ICD-10-PCS | Mod: S$GLB,,, | Performed by: OPHTHALMOLOGY

## 2020-02-12 PROCEDURE — 99999 PR PBB SHADOW E&M-EST. PATIENT-LVL II: ICD-10-PCS | Mod: PBBFAC,,, | Performed by: OPHTHALMOLOGY

## 2020-02-12 PROCEDURE — 99024 POSTOP FOLLOW-UP VISIT: CPT | Mod: S$GLB,,, | Performed by: OPHTHALMOLOGY

## 2020-02-12 NOTE — PROGRESS NOTES
HPI     Post-op Evaluation      Additional comments: PC IOL / Gonio OD              Comments     Pt states that her vision seems unchanged since last visit. Denies pain/   discomfort. She states she is using drops, admits that she doesn't always   get them in on time.    PCP DR. WOOD    1. COAG   SLT OD 4-11-19  2. PCIOL /ab internal CP OS+20.5 SN6AT6 / CDE10.06 / 07/26/18  PCIOL / Goniotomy OD +19.5 SN6AT6 /CDE 10.07/1/30/2020  3. DRY EYE   4. STRABISMUS WITH LASER  5. 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 Esotropia OD    *Intolerant to Rhopressa    OU- Combigan BID, LATANOPROST QD   OD- Durezol QID,  DIAMOX BID P.O.( finished on 02/07/20 )    OU PF systane 4-6 times daily , Refresh ointment at bedtime, Oasis 4-6x   daily OD          Last edited by Nelly Allison on 2/12/2020  2:49 PM. (History)            Assessment /Plan     For exam results, see Encounter Report.      ICD-10-CM ICD-9-CM    1. Post-operative state Z98.890 V45.89 S/p phaco/goniotomy OD 1/30/20. Doing well    2. Cataract extraction status, right Z98.41 V45.61    3. Primary open angle glaucoma (POAG) of both eyes, moderate stage H40.1132 365.11      365.72        D/c diamox    Reduce durezol OD to TID X 2/19, BID X 2/26, qd X 3/4  OU- Combigan BID, LATANOPROST QD     Return to clinic 4 weeks

## 2020-03-11 ENCOUNTER — OFFICE VISIT (OUTPATIENT)
Dept: OPHTHALMOLOGY | Facility: CLINIC | Age: 85
End: 2020-03-11
Payer: MEDICARE

## 2020-03-11 DIAGNOSIS — H50.011 ESOTROPIA OF RIGHT EYE: ICD-10-CM

## 2020-03-11 DIAGNOSIS — H40.1132 PRIMARY OPEN ANGLE GLAUCOMA (POAG) OF BOTH EYES, MODERATE STAGE: ICD-10-CM

## 2020-03-11 DIAGNOSIS — H50.22 HYPERTROPIA OF LEFT EYE: ICD-10-CM

## 2020-03-11 DIAGNOSIS — Z98.41 CATARACT EXTRACTION STATUS, RIGHT: ICD-10-CM

## 2020-03-11 DIAGNOSIS — Z98.890 POST-OPERATIVE STATE: Primary | ICD-10-CM

## 2020-03-11 PROCEDURE — 99024 PR POST-OP FOLLOW-UP VISIT: ICD-10-PCS | Mod: S$GLB,,, | Performed by: OPHTHALMOLOGY

## 2020-03-11 PROCEDURE — 99999 PR PBB SHADOW E&M-EST. PATIENT-LVL III: CPT | Mod: PBBFAC,,, | Performed by: OPHTHALMOLOGY

## 2020-03-11 PROCEDURE — 99024 POSTOP FOLLOW-UP VISIT: CPT | Mod: S$GLB,,, | Performed by: OPHTHALMOLOGY

## 2020-03-11 PROCEDURE — 99999 PR PBB SHADOW E&M-EST. PATIENT-LVL III: ICD-10-PCS | Mod: PBBFAC,,, | Performed by: OPHTHALMOLOGY

## 2020-03-11 NOTE — PROGRESS NOTES
HPI     Post-op Evaluation     Comments: S/P Phaco with IOL /Gonio OD (01-30-20)              Comments     Patient feels OD is doing well, no pain or irritation and using glaucoma   drops as directed.      PCP DR. WOOD    1. COAG   SLT OD 4-11-19  2. PCIOL /ab internal CP OS+20.5 SN6AT6 / CDE10.06 / 07/26/18  PCIOL / Goniotomy OD +19.5 SN6AT6 /CDE 10.07/1/30/2020  3. DRY EYE   4. STRABISMUS WITH LASER  5. 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 Esotropia OD    *Intolerant to Rhopressa    OU- Combigan BID, LATANOPROST QD   OU PF systane 4-6 times daily , Refresh ointment at bedtime, Oasis 4-6x   daily OD          Last edited by Erika Mcfarland, Patient Care Assistant on 3/11/2020  3:01   PM. (History)            Assessment /Plan     For exam results, see Encounter Report.      ICD-10-CM ICD-9-CM   1. Post-operative state S/p phaco/ Goniotomy OD - doing well -  Z98.890 V45.89   2. Cataract extraction status, right Z98.41 V45.61   3. Primary open angle glaucoma (POAG) of both eyes, moderate stage - resume meds and pt will increase dosing and compliance  H40.1132 365.11     365.72   4. Esotropia of right eye- needs prism and can disp rx today yet will wait on new Rx for now and make appt to see Dr Melinda Ross for eval  Jorge with pt that this is a large amount of prism and she will likely recommend surgery  H50.00 378.00   5. Hypertropia of left eye  H50.22 378.31     H.o Ret Hole - Old, well sealed - follow         OU- Combigan BID, LATANOPROST QD   OU PF systane 4-6 times daily , Refresh ointment at bedtime, Oasis 4-6x Daily OD     RETURN TO CLINIC 3 month IOP, HVF and GOCT

## 2020-03-11 NOTE — LETTER
[facility]  [address] [facility] [address]  Phone: [phone]  Fax: [phone]   March 11, 2020    Melinda Ross MD  [address]  [place] [address]    Patient: Katya Dorantes   MR Number: [id number]   YOB: 1929   Date of Visit: 3/11/2020       Dear Dr. Ross:    Thank you for referring Katya Dorantes to you for evaluation Here is my assessment and plan of care:    Assessment:  /    Plan:  For exam results, see Encounter Report.      ICD-10-CM ICD-9-CM   1. Post-operative state S/p phaco/ Goniotomy OD - doing well -  Z98.890 V45.89   2. Cataract extraction status, right Z98.41 V45.61   3. Primary open angle glaucoma (POAG) of both eyes, moderate stage - resume meds and pt will increase dosing and compliance  H40.1132 365.11     365.72   4. Esotropia of right eye- needs prism and can disp rx today yet will wait on new Rx for now and make appt to see Dr Melinda Ross for eval  Jorge with pt that this is a large amount of prism and she will likely recommend surgery  H50.00 378.00   5. Hypertropia of left eye  H50.22 378.31     H.o Ret Hole - Old, well sealed - follow       OU- Combigan BID, LATANOPROST QD   OU PF systane 4-6 times daily , Refresh ointment at bedtime, Oasis 4-6x Daily OD     RETURN TO CLINIC 3 month IOP, HVF and GOCT   RETURN TO CLINIC with Dr Ross for Eval       Below you will find my full exam findings. If you have questions, please do not hesitate to call me. I look forward to following Ms. Katya Dorantes along with you.    Sincerely,        Jonathan Sanches MD       CC  No Recipients             Base Eye Exam     Visual Acuity (Snellen - Linear)       Right Left    Dist sc 20/40           Tonometry (Applanation, 3:14 PM)       Right Left    Pressure 19.5 13   Pt states she is a little messed up w/ drops lately            Extraocular Movement    Esotropia            Neuro/Psych     Oriented x3:  Yes    Mood/Affect:   Normal            Slit Lamp and Fundus Exam     External Exam       Right Left    External Normal Normal          Slit Lamp Exam       Right Left    Lids/Lashes Normal Normal    Conjunctiva/Sclera White and quiet White and quiet    Cornea Clear Punctate stain    Anterior Chamber 1+ Cell, Trace Flare Deep and quiet    Iris Round and reactive Round and reactive    Lens Posterior chamber intraocular lens Posterior chamber intraocular lens            Refraction     Wearing Rx       Sphere Cylinder Axis Add    Right -0.25 +1.75 180 +2.50    Left [place] +1.50 180 +2.50    Type:  Bifocal          Manifest Refraction       Sphere Cylinder Axis Dist VA Horz Prism Vert Prism    Right [place] Sphere  20/30 3.5 out 2.0 up    Left [place] +1.00 180 20/25- 3.5 out 2.0 down          Final Rx       Sphere Cylinder Axis Dist VA Add Horz Prism Vert Prism    Right [place] Sphere  20/30 +2.50 3.5 out 2.0 up    Left [place] +1.00 180 20/25- +2.50 3.5 out 2.0 down

## 2020-03-18 ENCOUNTER — TELEPHONE (OUTPATIENT)
Dept: OPHTHALMOLOGY | Facility: CLINIC | Age: 85
End: 2020-03-18

## 2020-03-18 ENCOUNTER — OFFICE VISIT (OUTPATIENT)
Dept: OPHTHALMOLOGY | Facility: CLINIC | Age: 85
End: 2020-03-18
Payer: MEDICARE

## 2020-03-18 DIAGNOSIS — Z98.41 CATARACT EXTRACTION STATUS, RIGHT: ICD-10-CM

## 2020-03-18 DIAGNOSIS — Z98.42 CATARACT EXTRACTION STATUS, LEFT: ICD-10-CM

## 2020-03-18 DIAGNOSIS — Z98.890 POST-OPERATIVE STATE: Primary | ICD-10-CM

## 2020-03-18 DIAGNOSIS — H40.1132 PRIMARY OPEN ANGLE GLAUCOMA (POAG) OF BOTH EYES, MODERATE STAGE: ICD-10-CM

## 2020-03-18 PROCEDURE — 99999 PR PBB SHADOW E&M-EST. PATIENT-LVL II: CPT | Mod: PBBFAC,,, | Performed by: OPHTHALMOLOGY

## 2020-03-18 PROCEDURE — 99999 PR PBB SHADOW E&M-EST. PATIENT-LVL II: ICD-10-PCS | Mod: PBBFAC,,, | Performed by: OPHTHALMOLOGY

## 2020-03-18 PROCEDURE — 99024 PR POST-OP FOLLOW-UP VISIT: ICD-10-PCS | Mod: S$GLB,,, | Performed by: OPHTHALMOLOGY

## 2020-03-18 PROCEDURE — 99024 POSTOP FOLLOW-UP VISIT: CPT | Mod: S$GLB,,, | Performed by: OPHTHALMOLOGY

## 2020-03-18 RX ORDER — DIFLUPREDNATE OPHTHALMIC 0.5 MG/ML
1 EMULSION OPHTHALMIC 4 TIMES DAILY
Qty: 1 BOTTLE | Refills: 0 | Status: SHIPPED | OUTPATIENT
Start: 2020-03-18 | End: 2020-04-17

## 2020-03-18 NOTE — TELEPHONE ENCOUNTER
Spoke with pt.  She is having blurred vision since waking up this morning OD.  She had some pain OD yesterday when she wiped eye.  Appt today with Dr. Sanches

## 2020-03-18 NOTE — TELEPHONE ENCOUNTER
----- Message from Bella Ventura sent at 3/18/2020  8:03 AM CDT -----  Contact: Patient   Katya would like a call back at [phone], Regards to her eye is cloudy.    Thanks  Td

## 2020-03-18 NOTE — PROGRESS NOTES
HPI     Post-op Evaluation     Comments: PCIOL w/ Gonio OD 1/30/20              Comments     The patient states that when she woke up this morning she couldn't read   the paper with her but now she can see better. The patient states that   yesterday when she washed her face yesterday it hurt but it doesn't hurt   today. 100% drop compliance.    PCP DR. WOOD    1. COAG   SLT OD 4-11-19  2. PCIOL /ab internal CP OS+20.5 SN6AT6 / CDE10.06 / 07/26/18  PCIOL / Goniotomy OD +19.5 SN6AT6 /CDE 10.07/1/30/2020  3. DRY EYE   4. STRABISMUS WITH LASER  5. 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 Esotropia OD    Pt saw Dr Lee in the past and did not have sx     *Intolerant to Rhopressa    OU- Combigan BID, LATANOPROST QD   OU PF systane 4-6 times daily , Refresh ointment at bedtime, Oasis 4-6x   daily OD          Last edited by Chiquita Dorantes on 3/18/2020  1:52 PM. (History)            Assessment /Plan     For exam results, see Encounter Report.      ICD-10-CM ICD-9-CM    1. Post-operative state Z98.890 V45.89    2. Cataract extraction status, right Z98.41 V45.61    3. Primary open angle glaucoma (POAG) of both eyes, moderate stage H40.1132 365.11      365.72    4. 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

## 2020-04-01 ENCOUNTER — TELEPHONE (OUTPATIENT)
Dept: OPHTHALMOLOGY | Facility: CLINIC | Age: 85
End: 2020-04-01

## 2020-04-01 NOTE — TELEPHONE ENCOUNTER
----- Message from Fartun Magaña sent at 4/1/2020 10:14 AM CDT -----  Contact: Patient  Patient needs to talk to nurse regarding her eye, and her upcoming appt, please call her back at [phone]. Thank you

## 2020-04-02 ENCOUNTER — TELEPHONE (OUTPATIENT)
Dept: OPHTHALMOLOGY | Facility: CLINIC | Age: 85
End: 2020-04-02

## 2020-04-02 NOTE — TELEPHONE ENCOUNTER
----- Message from Ankur Santos sent at 4/2/2020  2:24 PM CDT -----  Contact: Self  Pt calling in regards to a call back from nurse in office please, pt has questions about eye drop medication she is taking       Please advise pt can be contact at

## 2020-04-07 ENCOUNTER — TELEPHONE (OUTPATIENT)
Dept: OPHTHALMOLOGY | Facility: CLINIC | Age: 85
End: 2020-04-07

## 2020-04-07 NOTE — TELEPHONE ENCOUNTER
----- Message from Odalis Palacios sent at 4/7/2020  8:37 AM CDT -----  Contact: pt  Pt wants to know if she is to come for appt on 4/15. Pt was told it would be canceled.

## 2020-04-07 NOTE — TELEPHONE ENCOUNTER
Left 2nd message for patient letting her know that we will ask  on tomorrow about her appt for 4/15.

## 2020-04-08 ENCOUNTER — TELEPHONE (OUTPATIENT)
Dept: OPHTHALMOLOGY | Facility: CLINIC | Age: 85
End: 2020-04-08

## 2020-04-08 NOTE — TELEPHONE ENCOUNTER
Called pt  About appt next week due to Covid Concerns   Dr Sanches wants her to taper Durezol from BID to Once daily until her next visit   She is booked in June but will call back in one month due to  the Covid outbreak if things quiet down we can check IOP in May, if not will wait

## 2020-06-17 ENCOUNTER — OFFICE VISIT (OUTPATIENT)
Dept: OPHTHALMOLOGY | Facility: CLINIC | Age: 85
End: 2020-06-17
Payer: MEDICARE

## 2020-06-17 DIAGNOSIS — H40.1132 PRIMARY OPEN ANGLE GLAUCOMA (POAG) OF BOTH EYES, MODERATE STAGE: Primary | ICD-10-CM

## 2020-06-17 DIAGNOSIS — H25.11 SENILE NUCLEAR SCLEROSIS, RIGHT: ICD-10-CM

## 2020-06-17 DIAGNOSIS — Z98.42 CATARACT EXTRACTION STATUS, LEFT: ICD-10-CM

## 2020-06-17 DIAGNOSIS — H52.7 REFRACTION DISORDER: ICD-10-CM

## 2020-06-17 PROCEDURE — 99999 PR PBB SHADOW E&M-EST. PATIENT-LVL II: ICD-10-PCS | Mod: PBBFAC,,, | Performed by: OPHTHALMOLOGY

## 2020-06-17 PROCEDURE — 92083 EXTENDED VISUAL FIELD XM: CPT | Mod: S$GLB,,, | Performed by: OPHTHALMOLOGY

## 2020-06-17 PROCEDURE — 92015 PR REFRACTION: ICD-10-PCS | Mod: S$GLB,,, | Performed by: OPHTHALMOLOGY

## 2020-06-17 PROCEDURE — 92015 DETERMINE REFRACTIVE STATE: CPT | Mod: S$GLB,,, | Performed by: OPHTHALMOLOGY

## 2020-06-17 PROCEDURE — 92083 HUMPHREY VISUAL FIELD - OU - BOTH EYES: ICD-10-PCS | Mod: S$GLB,,, | Performed by: OPHTHALMOLOGY

## 2020-06-17 PROCEDURE — 99999 PR PBB SHADOW E&M-EST. PATIENT-LVL II: CPT | Mod: PBBFAC,,, | Performed by: OPHTHALMOLOGY

## 2020-06-17 PROCEDURE — 92012 INTRM OPH EXAM EST PATIENT: CPT | Mod: S$GLB,,, | Performed by: OPHTHALMOLOGY

## 2020-06-17 PROCEDURE — 92012 PR EYE EXAM, EST PATIENT,INTERMED: ICD-10-PCS | Mod: S$GLB,,, | Performed by: OPHTHALMOLOGY

## 2020-06-17 NOTE — PROGRESS NOTES
HPI     Glaucoma     Comments: 3 month HVF, recheck iritis Durezol BID OD              Comments     PCP DR. MAIN    1. COAG   SLT OD 4-11-19  2. PCIOL /ab internal CP OS+20.5 SN6AT6 / CDE10.06 / 07/26/18  PCIOL / Goniotomy OD +19.5 SN6AT6 /CDE 10.07/1/30/2020  3. DRY EYE   4. STRABISMUS WITH LASER  5. 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 Esotropia OD    Pt saw Dr Lee in the past and did not have sx     *Intolerant to Rhopressa    OU- Combigan BID, LATANOPROST QD   OU PF systane 4-6 times daily , Refresh ointment at bedtime, Oasis 4-6x   daily OD  OD: Durezol BID          Last edited by Jakob Delong on 6/17/2020  2:28 PM. (History)            Assessment /Plan     For exam results, see Encounter Report.      ICD-10-CM ICD-9-CM    1. Primary open angle glaucoma (POAG) of both eyes, moderate stage  H40.1132 365.11 [facility] - OU - Extended - Both Eyes Done today   some changes on HVF, yet decreased reliability and results do not coincide with GOCT and ONH findings   IOP elevated OD, likey due to steroid   Deep and quiet on exam today- stop Durezol today        365.72    2. Cataract extraction status, left  Z98.42 V45.61    3. Senile nuclear sclerosis, right  H25.11 366.16    4. 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

## 2020-06-24 ENCOUNTER — OFFICE VISIT (OUTPATIENT)
Dept: OPHTHALMOLOGY | Facility: CLINIC | Age: 85
End: 2020-06-24
Payer: MEDICARE

## 2020-06-24 DIAGNOSIS — H52.7 REFRACTION DISORDER: ICD-10-CM

## 2020-06-24 DIAGNOSIS — H53.2 DIPLOPIA: Primary | ICD-10-CM

## 2020-06-24 PROCEDURE — 99499 UNLISTED E&M SERVICE: CPT | Mod: S$GLB,,, | Performed by: OPHTHALMOLOGY

## 2020-06-24 PROCEDURE — 99999 PR PBB SHADOW E&M-EST. PATIENT-LVL II: CPT | Mod: PBBFAC,,, | Performed by: OPHTHALMOLOGY

## 2020-06-24 PROCEDURE — 99999 PR PBB SHADOW E&M-EST. PATIENT-LVL II: ICD-10-PCS | Mod: PBBFAC,,, | Performed by: OPHTHALMOLOGY

## 2020-06-24 PROCEDURE — 99499 NO LOS: ICD-10-PCS | Mod: S$GLB,,, | Performed by: OPHTHALMOLOGY

## 2020-06-24 NOTE — PROGRESS NOTES
HPI     The patient states her glasses are not working good for her, she states   the new glasses she got she is seeing double one on top of the other.    PCP DR. WOOD    1. COAG   SLT OD 4-11-19  2. PCIOL /ab internal CP OS+20.5 SN6AT6 / CDE10.06 / 07/26/18  PCIOL / Goniotomy OD +19.5 SN6AT6 /CDE 10.07/1/30/2020  3. DRY EYE   4. STRABISMUS WITH LASER  5. 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 Esotropia OD    Pt saw Dr Lee in the past and did not have sx     *Intolerant to Rhopressa    OU- Combigan BID, LATANOPROST QD   OU PF systane 4-6 times daily , Refresh ointment at bedtime, Oasis 4-6x   daily OD    Last edited by Leatha Elliott on 6/24/2020  3:55 PM. (History)            Assessment /Plan     For exam results, see Encounter Report.      ICD-10-CM ICD-9-CM    1. Diplopia  H53.2 368.2    2. Refraction disorder  H52.7 367.9        Explained that patient will need surgery to correct her strabismus  Dr Ross and Dr Lee are the options here in [place] - contact numbers given

## 2020-06-25 ENCOUNTER — TELEPHONE (OUTPATIENT)
Dept: OPHTHALMOLOGY | Facility: CLINIC | Age: 85
End: 2020-06-25

## 2020-06-25 NOTE — TELEPHONE ENCOUNTER
----- Message from Fabienne Valdes sent at 6/25/2020 10:43 AM CDT -----  Type:  Needs Medical Advice     Who Called: [facility] nurse for  Dr wilson   Symptoms (please be specific):   How long has patient had these symptoms:   Pharmacy name and phone #:     Would the patient rather a call back or a response via [facility]? Call   Best Call Back Number: [phone]   Additional Information: caller did not leave any info would like to speak with nurse staff only

## 2020-06-25 NOTE — TELEPHONE ENCOUNTER
----- Message from Hina Black sent at 6/25/2020  4:11 PM CDT -----  Regarding: Eye Glass Prescription  Contact: Patient  Pt is requesting prescription for glasses be sent to [phone]     Pt can be reached at [phone]

## 2020-06-25 NOTE — TELEPHONE ENCOUNTER
Spoke with Mami at Dr. Lee's office.  Pt called to find out what prism was given to her by Dr. Lee, since she is having problems with her new glasses.  She preferred Dr. Lee's Rx.  Here are the recommendations given in 2017 for prism by Dr. Lee    5.00 JOSELYN OU    5.00 BD OS

## 2020-06-26 ENCOUNTER — TELEPHONE (OUTPATIENT)
Dept: OPHTHALMOLOGY | Facility: CLINIC | Age: 85
End: 2020-06-26

## 2020-06-26 NOTE — TELEPHONE ENCOUNTER
I spoke with patient. She is requesting her last prism measurement from Dr. Lee be placed into her most recent refraction.     Dr. Sanches OK'd this, but did explain to the patient these prisms are from Dr. Lee's recommendations from 2017.     When I first called the patient, I called her at the number that was left in the last message from the appointment desk, which is her cell phone listed in her chart. It went straight to [facility] [employer]. ([phone])    I called her phone number and she answered, stating she never uses her cell phone. I explained to her that we were sorry we couldn't reach her when she called previously, but we were calling the number that was given to us. I will fax her new prescription to [phone] as requested.    Dr. Sanches still recommends patient be seen by Dr. Lee for strabismus surgery.      Dr. Lee's prism correction:    5.0 JOSELYN OU  5.0 BD OS      Brigid

## 2020-06-29 ENCOUNTER — TELEPHONE (OUTPATIENT)
Dept: OPHTHALMOLOGY | Facility: CLINIC | Age: 85
End: 2020-06-29

## 2020-06-29 NOTE — TELEPHONE ENCOUNTER
----- Message from Montana Martinez sent at 6/29/2020 10:06 AM CDT -----  Contact: Ruth  Would like to consult with nurse regarding fax.   Ruth @ [facility] states she did not receive the full fax the vertical prism cannot see if it is a 5 or 6/  Please contact Ruth [phone].  Thanks/As

## 2020-06-29 NOTE — TELEPHONE ENCOUNTER
----- Message from Montana Martinez sent at 6/29/2020 10:06 AM CDT -----  Contact: Ruth  Would like to consult with nurse regarding fax.   Ruth @ [facility] states she did not receive the full fax the vertical prism cannot see if it is a 5 or 6/  Please contact Ruth [phone].  Thanks/As

## 2020-06-29 NOTE — TELEPHONE ENCOUNTER
----- Message from Odalis Palacios sent at 6/29/2020 11:00 AM CDT -----  Contact: [facility]  Unable to read the prescription that was faxed. Cant read last number Please call Ruth at [phone]  Pt still waiting.

## 2021-01-12 ENCOUNTER — IMMUNIZATION (OUTPATIENT)
Dept: INTERNAL MEDICINE | Facility: CLINIC | Age: 86
End: 2021-01-12
Payer: MEDICARE

## 2021-01-12 DIAGNOSIS — Z23 NEED FOR VACCINATION: ICD-10-CM

## 2021-01-12 PROCEDURE — 91300 COVID-19, MRNA, LNP-S, PF, 30 MCG/0.3 ML DOSE VACCINE: CPT | Mod: PBBFAC | Performed by: FAMILY MEDICINE

## 2021-02-02 ENCOUNTER — IMMUNIZATION (OUTPATIENT)
Dept: INTERNAL MEDICINE | Facility: CLINIC | Age: 86
End: 2021-02-02
Payer: MEDICARE

## 2021-02-02 DIAGNOSIS — Z23 NEED FOR VACCINATION: Primary | ICD-10-CM

## 2021-02-02 PROCEDURE — 0002A COVID-19, MRNA, LNP-S, PF, 30 MCG/0.3 ML DOSE VACCINE: CPT | Mod: PBBFAC | Performed by: FAMILY MEDICINE

## 2021-02-02 PROCEDURE — 91300 COVID-19, MRNA, LNP-S, PF, 30 MCG/0.3 ML DOSE VACCINE: CPT | Mod: PBBFAC | Performed by: FAMILY MEDICINE

## 2022-06-06 DIAGNOSIS — Z96.641 STATUS POST TOTAL HIP REPLACEMENT, RIGHT: Primary | ICD-10-CM

## 2022-06-13 ENCOUNTER — CLINICAL SUPPORT (OUTPATIENT)
Dept: REHABILITATION | Facility: HOSPITAL | Age: 87
End: 2022-06-13
Payer: MEDICARE

## 2022-06-13 DIAGNOSIS — R53.1 DECREASED STRENGTH, ENDURANCE, AND MOBILITY: ICD-10-CM

## 2022-06-13 DIAGNOSIS — Z74.09 DECREASED STRENGTH, ENDURANCE, AND MOBILITY: ICD-10-CM

## 2022-06-13 DIAGNOSIS — R68.89 DECREASED STRENGTH, ENDURANCE, AND MOBILITY: ICD-10-CM

## 2022-06-13 DIAGNOSIS — Z96.641 STATUS POST TOTAL HIP REPLACEMENT, RIGHT: ICD-10-CM

## 2022-06-13 DIAGNOSIS — R26.89 FUNCTIONAL GAIT ABNORMALITY: ICD-10-CM

## 2022-06-13 PROCEDURE — 97162 PT EVAL MOD COMPLEX 30 MIN: CPT

## 2022-06-13 PROCEDURE — 97110 THERAPEUTIC EXERCISES: CPT

## 2022-06-13 NOTE — PLAN OF CARE
[facility]   Physical Therapy Initial Evaluation   Date: 6/13/2022   Name: Katya Dorantes  Clinic Number: [id number]    Therapy Diagnosis:   Encounter Diagnoses   Name Primary?    Status post total hip replacement, right     Decreased strength, endurance, and mobility     Functional gait abnormality      Physician: Cain New MD    Physician Orders: PT Eval and Treat  Medical Diagnosis from Referral: Status post total hip replacement right  Evaluation Date: 6/13/2022  Authorization Period Expiration: 12/31/2022  Plan of Care Expiration: 9/13/2022  Progress Note Due: 7/13/2022  Visit # / Visits authorized: 1/ 1   FOTO: 1/3    Precautions: Standard and Anterior apprach s/p total hip replacement right 5/5/2022    Time In: 0910  Time Out: 0951  Total Appointment Time (timed & untimed codes): 41 minutes      SUBJECTIVE     Date of onset: 5/5/2022  History of current condition - Katya reports: to therapy status post anterior approach for right total hip replacement. Patient reports that she has been dealing with right sided hip pain since last May 2021 when she was gardening in the yard. Patient reports that she tried numerous interventions including Physical Therapy, Massage therapy, and seeing a bone specialist, etc but nothing relieved her pain. After switching doctors an MRI was performed and revealed a femoral fracture and at that point is when her surgery was scheduled.     Falls: Patient has not had any falls    Imaging: No imaging present in chart for current condition    Prior Therapy: Patient has had previous Home Health Physical Therapy following surgery.   Social History: Patient lives alone in a one level home. Patient has one step to get in and out of her house and two steps (one really large step and smaller step) on her deck. Patient has a pool and a pond, she would like to get back in her pool as soon as possible but has large steps to access pool.   Occupation:  Patient is retired.   Prior Level of Function: Patient  Was walking 3-4 times per day.   Current Level of Function: Patient is having difficulty tying her shoes, especially the right one. Performing hip flexion is very painful for her to do. Patient has difficulty performing active hip flexion to get right lower extremity into her bed without upper extremity assistance. Patient has difficulty putting her right lower extremity into the car as she would when getting into the drivers seat of the car.     Pain:  Current 0/10, worst 8/10, best 0/10   Location: right hip  Description: Aching, Sharp and Shooting  Aggravating Factors: Flexing her hip   Easing Factors: putting it back in a comfortable position    Dominant Extremity: Right    Patients goals: is to be able to stoop and get back up without hurting. Patient would be able to stick her leg into the car without pain.     Medical History:   Past Medical History:   Diagnosis Date    Abnormal EKG     Arthritis     Glaucoma        Surgical History:   Katya Dorantes  has a past surgical history that includes thumb surgery; Ankle surgery (Right); PCIOL (Left, 07/26/2018); EYE CANANLOPLASTY (Left, 07/26/2018); and Cataract extraction w/  intraocular lens implant (Right, 01/30/2020).    Medications:   Katya has a current medication list which includes the following prescription(s): combigan, docosahexaenoic acid/epa, latanoprost, levothyroxine, liothyronine, meclizine, pantoprazole, trazodone, UNABLE TO FIND, UNKNOWN TO PATIENT, and white petrolatum-mineral oil 57.3-42.5%.    Allergies:   Review of patient's allergies indicates:   Allergen Reactions    Codeine     Hydrocodone       OBJECTIVE     Sensation:  Sensation is intact to light touch  Posture:  Pt presents with postural abnormalities which include: forward head, rounded shoulders  and forward flexed trunk posturing with lack of neutral hip extension  Palpation: Increased adhesions and rigidity noted at  incision site on right anterior hip.   Movement Analysis: Patient requires upper extremity assistance to obtain right lower extremity on therapy mat. Patient requires upper extremity assistance to perform knee flexion due to pain in right anterior hip region.   Gait Analysis: The patient ambulated with the following assistive device: straight cane although patient does not utilize cane throughout gait cycle; the pt presents with the following gait abnormalities: decreased step length on right, decreased heel strike on right, decreased push off on right and decreased hip extension on right    (x = not tested due to pain and/or inability to obtain test position)    Range of Motion/Strength:     Hip Right  6/13/2022 Left  6/13/2022 Pain/Dysfunction with Movement Goal   AROM       Flexion (120)  95  120 Pain in right hip 120 No pain   Extension (30)  5 flexion  0 No pain 15 No pain   Abduction (45)  15 PROM  40 Mild discomfort 40 No pain   IR (45)  25  40 Tightness in right hip 40 No pain   ER (45)  10 PROM  40 Mild discomfort 40 No pain     Knee Right  6/13/2022 Left  6/13/2022 Pain/Dysfunction with Movement Goal   AROM    AROM   Flexion (135)  105 AAROM  145 Pain in right hip  145 No pain   Extension (0)  0  0 No  pain -     Assessed strength in modified seated position   L/E MMT Right Left Pain/Dysfunction with Movement Goal   Hip Flexion  3-/5 4-/5 Pain in right anterior hip 4+/5 B   Hip Extension  x x  4+/5 B   Hip Abduction  2/5 2/5 No pain 4+/5 B   Hip Adduction 2/5 2/5 No pain 4+/5 B   Knee Extension 4-/5 4-/5 No pain 5/5 B   Knee Flexion 3+/5 3+/5 No pain 5/5 B   Hip IR 2/5 4-/5 Mild discomfort  4+/5 B   Hip ER 2/5 4-/5 Mild discomfort 4+/5 B   Ankle DF x x  5/5 B   Ankle PF x x  5/5 B     MUSCLE LENGTH:     Muscle Tested  Right  6/13/2022 Left   6/13/2022 Goal   Hamstrings  decreased decreased Normal B   Gastrocnemius  decreased decreased Normal B   Soleus  decreased decreased Normal B     SPECIAL TESTS:      Right  6/13/2022 Left   6/13/2022 Goal   Straight Leg Raise Test (PROM) Positive Negative Negative B       Function:   CMS Impairment/Limitation/Restriction for FOTO Hip Survey    Therapist reviewed FOTO scores for Katya Dorantes on 6/13/2022.   FOTO documents entered into [employer] - see Media section.    Limitation Score: Will assess next session%         TREATMENT     Treatment Time In: 0941  Treatment Time Out: 0951  Total Treatment time (time-based codes) separate from Evaluation: 10 minutes    Katya received therapeutic exercises to develop strength, endurance, ROM, flexibility, posture and core stabilization for (10) minutes including:     Intervention Performed Today    Educated patient on the proper form and sequencing of all exercises provided in HEP today x Recall demonstration performed   Reiterated post operative hip precautions that she should be following at this time in her stage of healing x      PATIENT EDUCATION AND HOME EXERCISES     Home Exercises and Patient Education Provided    Education provided:    Patient educated on the impairments noted above and the effects of physical therapy intervention to improve overall condition and QOL.    Patient educated on the use of pillows to aid in neutral alignment of spine and extremities when sleeping in supine or side lying.   Patient educated on the importance of improved core and lower extremity strength in order to improve alignment of the spine and lower extremities with static positions and dynamic movement.    Patient educated on the importance of strong core and lower extremity musculature in order to improve both static and dynamic balance, improve gait mechanics, reduce fall risk and improve household and community mobility.     Written Home Exercises Provided: yes. Exercises were reviewed and Katya was able to demonstrate them prior to the end of the session.  Katya demonstrated good  understanding of the education provided. See EMR under  Patient Instructions for exercises provided during therapy sessions.      ASSESSMENT     Katya is a 89 y.o. female referred to outpatient Physical Therapy with a medical diagnosis of Status post total hip replacement right. Patient presents with  decreased ROM, decreased strength, decreased muscle length, impaired coordination, impaired balance, postural abnormalities, gait abnormalities, decreased overall function and scar tissue resulting in decreased quality of life as active hip flexion as very bothersome for her at this time and is having generalized difficulty with bed mobility, sit to stands, standing activities and long duration walking which significantly impaired her functional independence as she lives alone.     Patient prognosis is Good.   Patient will benefit from skilled outpatient Physical Therapy to address the deficits stated above and in the chart below, provide pt/family education, and to maximize pt's level of independence.     Plan of care discussed with patient: Yes  Pt's spiritual, cultural and educational needs considered and patient is agreeable to the plan of care and goals as stated below:     Anticipated Barriers for therapy: chronicity of condition (hip pain)    Medical Necessity is demonstrated by the following  History  Co-morbidities and personal factors that may impact the plan of care Co-morbidities:   previous fracture related to current impairments    Personal Factors:   coping style  social background  lifestyle     moderate   Examination  Body Structures and Functions, activity limitations and participation restrictions that may impact the plan of care Body Regions:   lower extremities    Body Systems:    gross symmetry  ROM  strength  gross coordinated movement  balance  gait  transfers  transitions  motor control  motor learning  scar formation  skin integrity    Participation Restrictions:   Impaired ability to perform the above described tasks    Activity limitations:    Learning and applying knowledge  no deficits    General Tasks and Commands  no deficits    Communication  no deficits    Mobility  lifting and carrying objects  walking  transitioning into drivers side of car, bed mobility with right lower extremity    Self care  washing oneself (bathing, drying, washing hands)  dressing    Domestic Life  shopping  cooking  doing house work (cleaning house, washing dishes, laundry)  assisting others    Interactions/Relationships  no deficits    Life Areas  no deficits    Community and Social Life  community life  recreation and leisure         moderate   Clinical Presentation evolving clinical presentation with changing clinical characteristics moderate   Decision Making/ Complexity Score: moderate     Goals:  Short Term Goals:  6 weeks 6/13/2022   1. Pain: Patient will demonstrate improved pain by reports of less than or equal to 4/10 worst pain on the verbal rating scale in order to progress toward maximal functional ability and improve QOL.    2. Will assess next session and adjust accordingly Function: Patient will demonstrate improved function as indicated by a score of less than or equal to % impairment on FOTO.     3. Mobility: Patient will improve AROM to 75% of stated goals, listed in objective measures above, in order to progress towards independence with functional activities.     4. Strength: Patient will improve strength to 4-/5 in all muscle groups, listed in objective measures above, in order to progress towards independence with functional activities.     5. Gait: Patient will demonstrate improved gait mechanics including more functional hip and knee flexion and extension in order to improve functional mobility, improve quality of life, and decrease risk of further injury or fall.     6. HEP: Patient will demonstrate independence with HEP in order to progress toward functional independence.      Long Term Goals:  12 weeks 6/13/2022   1. Pain: Patient will  demonstrate improved pain by reports of less than or equal to 1/10 worst pain on the verbal rating scale in order to progress toward maximal functional ability and improve QOL.      2. Will assess next session and adjust accordingly Function: Patient will demonstrate improved function as indicated by a score of less than or equal to % impairment on FOTO.     3. Mobility: Patient will improve AROM to stated goals, listed in objective measures above, in order to return to maximal functional potential and improve quality of life.    4. Strength: Patient will improve strength to stated goals, listed in objective measures above, in order to improve functional independence and quality of life.    5. Gait: Patient will demonstrate normalized gait mechanics with minimal compensation in order to return to PLOF.    6. Patient will return to normal ADL's, IADL's, recreational activities, and work-related activities with less than or equal to 1/10 pain and maximal function.       PLAN   Plan of care Certification: 6/13/2022 to 9/13/2022.    Outpatient Physical Therapy 2 times weekly for 12 weeks to include any combination of the following interventions: Virtual Therapy, Dry Needling, modalities, electrical stimulation (IFC, Pre-Mod, Attended with Functional Dry Needling), Aquatic Therapy, Cervical/Lumbar Traction, Electrical Stimulation unattended/attended, Gait Training, Manual Therapy, Moist Heat/ Ice, Neuromuscular Re-ed, Patient Education, Self Care, Therapeutic Activities and Therapeutic Exercise    Thank you for this referral.    These services are reasonable and necessary for the conditions set forth above while under my care.    I CERTIFY THE NEED FOR THESE SERVICES FURNISHED UNDER THIS PLAN OF TREATMENT AND WHILE UNDER MY CARE   Physician's comments:     Physician's Signature: ___________________________________________________         Leatha Jurado PT, DPT

## 2022-06-20 ENCOUNTER — CLINICAL SUPPORT (OUTPATIENT)
Dept: REHABILITATION | Facility: HOSPITAL | Age: 87
End: 2022-06-20
Payer: MEDICARE

## 2022-06-20 DIAGNOSIS — R26.89 FUNCTIONAL GAIT ABNORMALITY: ICD-10-CM

## 2022-06-20 DIAGNOSIS — R68.89 DECREASED STRENGTH, ENDURANCE, AND MOBILITY: Primary | ICD-10-CM

## 2022-06-20 DIAGNOSIS — Z74.09 DECREASED STRENGTH, ENDURANCE, AND MOBILITY: Primary | ICD-10-CM

## 2022-06-20 DIAGNOSIS — R53.1 DECREASED STRENGTH, ENDURANCE, AND MOBILITY: Primary | ICD-10-CM

## 2022-06-20 PROCEDURE — 97140 MANUAL THERAPY 1/> REGIONS: CPT

## 2022-06-20 PROCEDURE — 97110 THERAPEUTIC EXERCISES: CPT
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
## 2022-06-24 ENCOUNTER — DOCUMENTATION ONLY (OUTPATIENT)
Dept: REHABILITATION | Facility: HOSPITAL | Age: 87
End: 2022-06-24
Payer: MEDICARE

## 2022-06-24 ENCOUNTER — CLINICAL SUPPORT (OUTPATIENT)
Dept: REHABILITATION | Facility: HOSPITAL | Age: 87
End: 2022-06-24
Payer: MEDICARE

## 2022-06-24 DIAGNOSIS — Z74.09 DECREASED STRENGTH, ENDURANCE, AND MOBILITY: Primary | ICD-10-CM

## 2022-06-24 DIAGNOSIS — R53.1 DECREASED STRENGTH, ENDURANCE, AND MOBILITY: Primary | ICD-10-CM

## 2022-06-24 DIAGNOSIS — R68.89 DECREASED STRENGTH, ENDURANCE, AND MOBILITY: Primary | ICD-10-CM

## 2022-06-24 DIAGNOSIS — R26.89 FUNCTIONAL GAIT ABNORMALITY: ICD-10-CM

## 2022-06-24 PROCEDURE — 97140 MANUAL THERAPY 1/> REGIONS: CPT | Mod: CQ

## 2022-06-24 PROCEDURE — 97110 THERAPEUTIC EXERCISES: CPT | Mod: CQ

## 2022-06-24 NOTE — PROGRESS NOTES
PT/PTA met face to face to discuss pt's treatment plan and progress towards established goals. Pt will be seen by a physical therapist minimally every 6th visit or every 30 days.        Jonathan Chambers PTA

## 2022-06-24 NOTE — PROGRESS NOTES
[facility]   Physical Therapy Treatment Note     Name: Katya Dorantes  Clinic Number: [id number]    Therapy Diagnosis:   Encounter Diagnoses   Name Primary?    Decreased strength, endurance, and mobility Yes    Functional gait abnormality      Physician: Cain New MD    Visit Date: 6/24/2022    Physician Orders: PT Eval and Treat  Medical Diagnosis from Referral: Status post total hip replacement right  Evaluation Date: 6/13/2022  Authorization Period Expiration: 12/31/2022  Plan of Care Expiration: 9/13/2022  Progress Note Due: 7/13/2022  Visit # / Visits authorized: 2/20 (+1 for evaluation)  FOTO: 1/3    PTA Visit #: 1/5     Progress Note Due on 7/13/2022    Time In: 1415  Time Out: 1500  Total Billable Time: 40 minutes (denotes billable time)    Precautions: Standard and Anterior apprach s/p total hip replacement right 5/5/2022 (NO EXTENSION, EXTERNAL ROTATION, OR ABDUCTION)     SUBJECTIVE     Pt reports: got her hand rain installed in her pool and is getting in/out of pool much easier.      She was compliant with home exercise program, performing daily.    Response to previous treatment: Good compliance with HOME EXERCISE PROGRAM    Functional change: Able to linette pants without assistance from her upper extremities to lift her right leg into her pants, not walking with her straight cane. Difficulty with walking on unlevel rock surface in her back yard.     Pain: 0/10  Location: right hip (no pain today)    OBJECTIVE     Objective Measures updated at progress report unless specified.     Treatment     Katya received therapeutic exercises to develop strength, endurance, ROM, flexibility, posture and core stabilization for (30) minutes including:     Intervention Performed Today    NuStep x 5 minutes level 2   Heel Slides  x 2 x 10 right, 3 x 10 left lower extremity    Hip Adduction Ball Squeezes x 3x10 reps    Hip Abduction with Band in Supine x 3x10 reps green theraband    Hip  Flexion in Supine with PT Assist on Right  2x10 reps B   Hip Abduction/Adduction in Supine Small Range with PT Assist   Right 2x10 reps   Quad sets x 3 minutes   Glut sets x 3 minutes     Plan for Next Visit: Continue to progress as tolerated and per surgical precautions     Katya received the following manual therapy techniques: Myofacial release, Soft tissue Mobilization and Scar Mobilizations were applied to the: right scar mobilizations and right upper leg musculature for (10) minutes, including:    Manual Intervention Performed Today    Soft Tissue Mobilization x right hip adductors  and scar mobilizations   Joint Mobilizations     Mobilization with movement          Functional Dry Needling        Plan for Next Visit: Continue as needed      Patient Education and Home Exercises     Reviewed anterior hip precautions with patient.     Home Exercises Provided and Patient Education Provided     Education provided:   - Educated patient on the proper form and sequencing of all exercises performed today including going back over her HEP exercises as she had questions on repetitions and form as well as modifications   - Educated patient on proper way to perform scar mobilizations at home on her own to assist in decreasing adhesions   6/24/2022: reviewed hip precautions with patient. Advised to avoid wearing flip-flop style shoes for added safety and suggested to patient to wear a closed heel shoe as opposed to ambulating bare-footed.     Written Home Exercises Provided: Patient instructed to cont prior HEP. Exercises were reviewed and Katya was able to demonstrate them prior to the end of the session.  Katya demonstrated good  understanding of the education provided. See EMR under Patient Instructions for exercises provided during therapy sessions      ASSESSMENT     Patient tolerated treatment today with an improved abilty to better able lift her right lower extremity while in supine but with instructions to slow her  reps with heel slides, she experienced more fatigue and required more rest breaks. Also worked extensively on scar mobility as significant adhesions are present throughout. Patient with better understanding of all her hip precautions after this session.      Katya Is progressing well towards her goals.   Pt prognosis is Good.     Pt will continue to benefit from skilled outpatient physical therapy to address the deficits listed in the problem list box on initial evaluation, provide pt/family education and to maximize pt's level of independence in the home and community environment.     Pt's spiritual, cultural and educational needs considered and pt agreeable to plan of care and goals.     Anticipated barriers to physical therapy: chronicity of condition (hip pain)    Goals:   Short Term Goals:  6 weeks 6/13/2022   1. Pain: Patient will demonstrate improved pain by reports of less than or equal to 4/10 worst pain on the verbal rating scale in order to progress toward maximal functional ability and improve QOL.     2. Will assess next session and adjust accordingly Function: Patient will demonstrate improved function as indicated by a score of less than or equal to % impairment on FOTO.      3. Mobility: Patient will improve AROM to 75% of stated goals, listed in objective measures above, in order to progress towards independence with functional activities.      4. Strength: Patient will improve strength to 4-/5 in all muscle groups, listed in objective measures above, in order to progress towards independence with functional activities.      5. Gait: Patient will demonstrate improved gait mechanics including more functional hip and knee flexion and extension in order to improve functional mobility, improve quality of life, and decrease risk of further injury or fall.      6. HEP: Patient will demonstrate independence with HEP in order to progress toward functional independence.        Long Term Goals:  12 weeks  6/13/2022   1. Pain: Patient will demonstrate improved pain by reports of less than or equal to 1/10 worst pain on the verbal rating scale in order to progress toward maximal functional ability and improve QOL.       2. Will assess next session and adjust accordingly Function: Patient will demonstrate improved function as indicated by a score of less than or equal to % impairment on FOTO.      3. Mobility: Patient will improve AROM to stated goals, listed in objective measures above, in order to return to maximal functional potential and improve quality of life.     4. Strength: Patient will improve strength to stated goals, listed in objective measures above, in order to improve functional independence and quality of life.     5. Gait: Patient will demonstrate normalized gait mechanics with minimal compensation in order to return to PLOF.     6. Patient will return to normal ADL's, IADL's, recreational activities, and work-related activities with less than or equal to 1/10 pain and maximal function.         PLAN     Continue POC and frequency as planned. Continue to progress range of motion and strengthening to tolerance      These services are reasonable and necessary for the conditions set forth above while under my care.    Jonathan Chambers, PTA

## 2022-06-27 ENCOUNTER — CLINICAL SUPPORT (OUTPATIENT)
Dept: REHABILITATION | Facility: HOSPITAL | Age: 87
End: 2022-06-27
Payer: MEDICARE

## 2022-06-27 DIAGNOSIS — R53.1 DECREASED STRENGTH, ENDURANCE, AND MOBILITY: Primary | ICD-10-CM

## 2022-06-27 DIAGNOSIS — R26.89 FUNCTIONAL GAIT ABNORMALITY: ICD-10-CM

## 2022-06-27 DIAGNOSIS — R68.89 DECREASED STRENGTH, ENDURANCE, AND MOBILITY: Primary | ICD-10-CM

## 2022-06-27 DIAGNOSIS — Z74.09 DECREASED STRENGTH, ENDURANCE, AND MOBILITY: Primary | ICD-10-CM

## 2022-06-27 PROCEDURE — 97110 THERAPEUTIC EXERCISES: CPT | Mod: CQ

## 2022-06-27 NOTE — PROGRESS NOTES
[facility]   Physical Therapy Treatment Note     Name: Katya Dorantes  Clinic Number: [id number]    Therapy Diagnosis:   Encounter Diagnoses   Name Primary?    Decreased strength, endurance, and mobility Yes    Functional gait abnormality      Physician: Cain New MD    Visit Date: 6/27/2022    Physician Orders: PT Eval and Treat  Medical Diagnosis from Referral: Status post total hip replacement right  Evaluation Date: 6/13/2022  Authorization Period Expiration: 12/31/2022  Plan of Care Expiration: 9/13/2022  Progress Note Due: 7/13/2022  Visit # / Visits authorized: 3/20 (+1 for evaluation)  FOTO: 1/3    PTA Visit #: 2/5     Progress Note Due on 7/13/2022    Time In: 1455  Time Out: 1545  Total Billable Time: 45 minutes (denotes billable time)    Precautions: Standard and Anterior apprach s/p total hip replacement right 5/5/2022 (NO EXTENSION, EXTERNAL ROTATION, OR ABDUCTION)     SUBJECTIVE     Pt reports: no pain except for when she tries to cross her legs. More walking to shopping today and that it is easier getting in/out of vehicle. Went to her chiropractor this morning for her back to be adjusted (she has been going to a chiropractor majority of her life).     She was compliant with home exercise program, performing daily.    Response to previous treatment: felt good after her last session.    Functional change: Able to linette pants without assistance from her upper extremities to lift her right leg into her pants, not walking with her straight cane. More stable with walking on unlevel rock surface in her back yard without her cane.     Pain: 0/10  Location: right hip (no pain today)    OBJECTIVE     Objective Measures updated at progress report unless specified.     Treatment     Katya received therapeutic exercises to develop strength, endurance, ROM, flexibility, posture and core stabilization for (45) minutes including:     Intervention Performed Today    NuStep x 5  minutes level 2   Heel Slides  x 2 x 10 right, 3 x 10 left lower extremity (increased)   Hip Adduction Ball Squeezes x 3 minutes 5 second hold    Hip Abduction with Band in Supine x 3x10 reps green theraband    Hip Flexion in Supine with PT Assist on Right  2x10 reps B   Hip Abduction/Adduction in Supine Small Range with PT Assist   Right 2x10 reps   Quad sets x 3 minutes   Glut sets x 3 minutes   Straight leg raise  x 2 x 8 with active range of motion eccentric control (added)   Bridges  x 3  X 10 (added)               Plan for Next Visit: Continue to progress as tolerated and per surgical precautions     Katya received the following manual therapy techniques: Myofacial release, Soft tissue Mobilization and Scar Mobilizations were applied to the: right scar mobilizations and right upper leg musculature for (0) minutes, including:    Manual Intervention Performed Today    Soft Tissue Mobilization  right hip adductors  and scar mobilizations   Joint Mobilizations     Mobilization with movement          Functional Dry Needling        Plan for Next Visit: Continue as needed      Patient Education and Home Exercises     Reviewed anterior hip precautions with patient.     Home Exercises Provided and Patient Education Provided     Education provided:   - Educated patient on the proper form and sequencing of all exercises performed today including going back over her HEP exercises as she had questions on repetitions and form as well as modifications   - Educated patient on proper way to perform scar mobilizations at home on her own to assist in decreasing adhesions   6/24/2022: reviewed hip precautions with patient. Advised to avoid wearing flip-flop style shoes for added safety and suggested to patient to wear a closed heel shoe as opposed to ambulating bare-footed.   6/27/2022: reviewed hip precautions with patient and educated to not cross her lower extremities to avoid external rotation/hip abduction    Written Home  Exercises Provided: Patient instructed to cont prior HEP. Exercises were reviewed and Katya was able to demonstrate them prior to the end of the session.  Katya demonstrated good  understanding of the education provided. See EMR under Patient Instructions for exercises provided during therapy sessions        ASSESSMENT     Patient had no inclination of her not being able to cross her lower extremities due to her hip precautions. After explanation of precautions, she had a better understanding of her hip precautions and movements to avoid. Added straight leg raise with minimal assistance with patient controlling eccentric motion.       Katya Is progressing well towards her goals.   Pt prognosis is Good.     Pt will continue to benefit from skilled outpatient physical therapy to address the deficits listed in the problem list box on initial evaluation, provide pt/family education and to maximize pt's level of independence in the home and community environment.     Pt's spiritual, cultural and educational needs considered and pt agreeable to plan of care and goals.     Anticipated barriers to physical therapy: chronicity of condition (hip pain)    Goals:   Short Term Goals:  6 weeks 6/13/2022   1. Pain: Patient will demonstrate improved pain by reports of less than or equal to 4/10 worst pain on the verbal rating scale in order to progress toward maximal functional ability and improve QOL.     2. Will assess next session and adjust accordingly Function: Patient will demonstrate improved function as indicated by a score of less than or equal to % impairment on FOTO.      3. Mobility: Patient will improve AROM to 75% of stated goals, listed in objective measures above, in order to progress towards independence with functional activities.      4. Strength: Patient will improve strength to 4-/5 in all muscle groups, listed in objective measures above, in order to progress towards independence with functional activities.       5. Gait: Patient will demonstrate improved gait mechanics including more functional hip and knee flexion and extension in order to improve functional mobility, improve quality of life, and decrease risk of further injury or fall.      6. HEP: Patient will demonstrate independence with HEP in order to progress toward functional independence.        Long Term Goals:  12 weeks 6/13/2022   1. Pain: Patient will demonstrate improved pain by reports of less than or equal to 1/10 worst pain on the verbal rating scale in order to progress toward maximal functional ability and improve QOL.       2. Will assess next session and adjust accordingly Function: Patient will demonstrate improved function as indicated by a score of less than or equal to % impairment on FOTO.      3. Mobility: Patient will improve AROM to stated goals, listed in objective measures above, in order to return to maximal functional potential and improve quality of life.     4. Strength: Patient will improve strength to stated goals, listed in objective measures above, in order to improve functional independence and quality of life.     5. Gait: Patient will demonstrate normalized gait mechanics with minimal compensation in order to return to PLOF.     6. Patient will return to normal ADL's, IADL's, recreational activities, and work-related activities with less than or equal to 1/10 pain and maximal function.         PLAN     Continue POC and frequency as planned. Continue to progress range of motion and strengthening to tolerance      These services are reasonable and necessary for the conditions set forth above while under my care.    Jonathan Chambers, PTA

## 2022-06-30 ENCOUNTER — CLINICAL SUPPORT (OUTPATIENT)
Dept: REHABILITATION | Facility: HOSPITAL | Age: 87
End: 2022-06-30
Payer: MEDICARE

## 2022-06-30 DIAGNOSIS — R53.1 DECREASED STRENGTH, ENDURANCE, AND MOBILITY: Primary | ICD-10-CM

## 2022-06-30 DIAGNOSIS — R26.89 FUNCTIONAL GAIT ABNORMALITY: ICD-10-CM

## 2022-06-30 DIAGNOSIS — R68.89 DECREASED STRENGTH, ENDURANCE, AND MOBILITY: Primary | ICD-10-CM

## 2022-06-30 DIAGNOSIS — Z74.09 DECREASED STRENGTH, ENDURANCE, AND MOBILITY: Primary | ICD-10-CM

## 2022-06-30 PROCEDURE — 97110 THERAPEUTIC EXERCISES: CPT | Mod: CQ

## 2022-06-30 NOTE — PROGRESS NOTES
[facility]   Physical Therapy Treatment Note     Name: Katya Dorantes  Clinic Number: [id number]    Therapy Diagnosis:   Encounter Diagnoses   Name Primary?    Decreased strength, endurance, and mobility Yes    Functional gait abnormality      Physician: Cain New MD    Visit Date: 6/30/2022    Physician Orders: PT Eval and Treat  Medical Diagnosis from Referral: Status post total hip replacement right  Evaluation Date: 6/13/2022  Authorization Period Expiration: 12/31/2022  Plan of Care Expiration: 9/13/2022  Progress Note Due: 7/13/2022  Visit # / Visits authorized: 4/20 (+1 for evaluation)  FOTO: 1/3    PTA Visit #: 3/5     Progress Note Due on 7/13/2022    Time In: 1120  Time Out: 1155  Total Billable Time: 40 minutes (denotes billable time)    Precautions: Standard and Anterior apprach s/p total hip replacement right 5/5/2022 (NO EXTENSION, EXTERNAL ROTATION, OR ABDUCTION)     SUBJECTIVE     Pt reports: that she is extremely fatigued today. Did her exercises already this morning. Feels like she needs to have her walker today to be able to lean on it.      She was compliant with home exercise program, performing daily.    Response to previous treatment: felt good after her last session.    Functional change: Able to linette pants without assistance from her upper extremities to lift her right leg into her pants, not walking with her straight cane. More stable with walking on unlevel rock surface in her back yard without her cane.     Pain: 0/10  Location: right hip (no pain today)    OBJECTIVE     Objective Measures updated at progress report unless specified.     Treatment     Katya received therapeutic exercises to develop strength, endurance, ROM, flexibility, posture and core stabilization for (40) minutes including:     Intervention Performed Today    NuStep x 5 minutes level 2   Heel Slides  x 3 x 10 bilateral  (increased)   Hip Adduction Ball Squeezes x 3 minutes 5 second  hold    Hip Abduction with Band in Supine  3x10 reps green theraband    Hip Flexion in Supine with PT Assist on Right  2x10 reps B   Hip Abduction/Adduction in Supine Small Range with PT Assist   Right 2x10 reps   Quad sets x 3 minutes   Glut sets  3 minutes   Straight leg raise   2 x 8 with active range of motion eccentric control (added)   Bridges  x 3  X 10    Long arch quad  x 3 x 10 bilateral   Hip flexion x 3 x 10 sitting     Plan for Next Visit: Continue to progress as tolerated and per surgical precautions     Katya received the following manual therapy techniques: Myofacial release, Soft tissue Mobilization and Scar Mobilizations were applied to the: right scar mobilizations and right upper leg musculature for (0) minutes, including:    Manual Intervention Performed Today    Soft Tissue Mobilization  right hip adductors  and scar mobilizations   Joint Mobilizations     Mobilization with movement          Functional Dry Needling        Plan for Next Visit: Continue as needed      Patient Education and Home Exercises       Home Exercises Provided and Patient Education Provided     Education provided:   - Educated patient on the proper form and sequencing of all exercises performed today including going back over her HEP exercises as she had questions on repetitions and form as well as modifications   - Educated patient on proper way to perform scar mobilizations at home on her own to assist in decreasing adhesions   6/24/2022: reviewed hip precautions with patient. Advised to avoid wearing flip-flop style shoes for added safety and suggested to patient to wear a closed heel shoe as opposed to ambulating bare-footed.   6/27/2022: reviewed hip precautions with patient and educated to not cross her lower extremities to avoid external rotation/hip abduction    Written Home Exercises Provided: Patient instructed to cont prior HEP. Exercises were reviewed and Katya was able to demonstrate them prior to the end of  the session.  Katya demonstrated good  understanding of the education provided. See EMR under Patient Instructions for exercises provided during therapy sessions      ASSESSMENT     Patient required cues for performing exercises with slow controlled movement patterns. Initially, she complained about anterior hip pain with with right heel slides with lower extremity out straight but as this exercise was progressed, her pain lessened. She was able to perform all her exercises today with fatigue but with good quality after initial instructions.      Katya Is progressing well towards her goals.   Pt prognosis is Good.     Pt will continue to benefit from skilled outpatient physical therapy to address the deficits listed in the problem list box on initial evaluation, provide pt/family education and to maximize pt's level of independence in the home and community environment.     Pt's spiritual, cultural and educational needs considered and pt agreeable to plan of care and goals.     Anticipated barriers to physical therapy: chronicity of condition (hip pain)    Goals:   Short Term Goals:  6 weeks 6/13/2022   1. Pain: Patient will demonstrate improved pain by reports of less than or equal to 4/10 worst pain on the verbal rating scale in order to progress toward maximal functional ability and improve QOL.  MET 6/30/2022   2. Will assess next session and adjust accordingly Function: Patient will demonstrate improved function as indicated by a score of less than or equal to % impairment on FOTO.      3. Mobility: Patient will improve AROM to 75% of stated goals, listed in objective measures above, in order to progress towards independence with functional activities.      4. Strength: Patient will improve strength to 4-/5 in all muscle groups, listed in objective measures above, in order to progress towards independence with functional activities.      5. Gait: Patient will demonstrate improved gait mechanics including more  functional hip and knee flexion and extension in order to improve functional mobility, improve quality of life, and decrease risk of further injury or fall.      6. HEP: Patient will demonstrate independence with HEP in order to progress toward functional independence.        Long Term Goals:  12 weeks 6/13/2022   1. Pain: Patient will demonstrate improved pain by reports of less than or equal to 1/10 worst pain on the verbal rating scale in order to progress toward maximal functional ability and improve QOL.       2. Will assess next session and adjust accordingly Function: Patient will demonstrate improved function as indicated by a score of less than or equal to % impairment on FOTO.      3. Mobility: Patient will improve AROM to stated goals, listed in objective measures above, in order to return to maximal functional potential and improve quality of life.     4. Strength: Patient will improve strength to stated goals, listed in objective measures above, in order to improve functional independence and quality of life.     5. Gait: Patient will demonstrate normalized gait mechanics with minimal compensation in order to return to PLOF.     6. Patient will return to normal ADL's, IADL's, recreational activities, and work-related activities with less than or equal to 1/10 pain and maximal function.         PLAN     Continue POC and frequency as planned. Continue to progress range of motion and strengthening to tolerance      These services are reasonable and necessary for the conditions set forth above while under my care.    Jonathan Chambers, PTA

## 2022-07-08 ENCOUNTER — CLINICAL SUPPORT (OUTPATIENT)
Dept: REHABILITATION | Facility: HOSPITAL | Age: 87
End: 2022-07-08
Payer: MEDICARE

## 2022-07-08 DIAGNOSIS — R26.89 FUNCTIONAL GAIT ABNORMALITY: ICD-10-CM

## 2022-07-08 DIAGNOSIS — R68.89 DECREASED STRENGTH, ENDURANCE, AND MOBILITY: Primary | ICD-10-CM

## 2022-07-08 DIAGNOSIS — Z74.09 DECREASED STRENGTH, ENDURANCE, AND MOBILITY: Primary | ICD-10-CM

## 2022-07-08 DIAGNOSIS — R53.1 DECREASED STRENGTH, ENDURANCE, AND MOBILITY: Primary | ICD-10-CM

## 2022-07-08 PROCEDURE — 97140 MANUAL THERAPY 1/> REGIONS: CPT | Mod: CQ

## 2022-07-08 PROCEDURE — 97110 THERAPEUTIC EXERCISES: CPT | Mod: CQ

## 2022-07-08 NOTE — PROGRESS NOTES
"[facility]   Physical Therapy Treatment Note     Name: Katya Dorantes  Clinic Number: [id number]    Therapy Diagnosis:   Encounter Diagnoses   Name Primary?    Decreased strength, endurance, and mobility Yes    Functional gait abnormality      Physician: Cain New MD    Visit Date: 7/8/2022    Physician Orders: PT Eval and Treat  Medical Diagnosis from Referral: Status post total hip replacement right  Evaluation Date: 6/13/2022  Authorization Period Expiration: 12/31/2022  Plan of Care Expiration: 9/13/2022  Progress Note Due: 7/13/2022  Visit # / Visits authorized: 5/20 (+1 for evaluation)  FOTO: 1/3    PTA Visit #: 4/5     Progress Note Due on 7/13/2022    Time In: 0915  Time Out: 1000  Total Billable Time: 41 minutes (denotes billable time)    Precautions: Standard and Anterior apprach s/p total hip replacement right 5/5/2022 (NO EXTENSION, EXTERNAL ROTATION, OR ABDUCTION)     SUBJECTIVE     Pt reports: that she feels resistance from the movement in her right hip today. Describes sensation as a "hurt" but not a "pain".       She was compliant with home exercise program, performing daily.    Response to previous treatment: felt good after her last session.    Functional change: Able to linette pants without assistance from her upper extremities to lift her right leg into her pants, not walking with her straight cane. More stable with walking on unlevel rock surface in her back yard without her cane.     Pain: 2/10  Location: right hip (no pain today)    OBJECTIVE     Objective Measures updated at progress report unless specified.     Treatment     Katya received therapeutic exercises to develop strength, endurance, ROM, flexibility, posture and core stabilization for (32) minutes including:     Intervention Performed Today    NuStep  5 minutes level 2   Exercise bike x 5 minutes (added)   Heel Slides  x 4 x 10 bilateral  (increased)   Hip Adduction Ball Squeezes x 3 minutes 5 " second hold    Hip Abduction with Band in Supine  3x10 reps green theraband    Hip Flexion in Supine with PT Assist on Right  2x10 reps B   Hip Abduction/Adduction in Supine Small Range with PT Assist   Right 2x10 reps   Quad sets  3 minutes   Glut sets  3 minutes   Straight leg raise  x 2 x 8 with active range of motion eccentric control right lower extremity, 3 x 10 active range of motion left lower extremity     Bridges  x 4  X 10 (increased)   Long arch quad   3 x 10 bilateral   Hip flexion  3 x 10 sitting     Plan for Next Visit: Continue to progress as tolerated and per surgical precautions     Katya received the following manual therapy techniques: Myofacial release, Soft tissue Mobilization and Scar Mobilizations were applied to the: right scar mobilizations and right upper leg musculature for (9) minutes, including:    Manual Intervention Performed Today    Soft Tissue Mobilization x right  and scar mobilizations   Joint Mobilizations     Mobilization with movement          Functional Dry Needling        Plan for Next Visit: Continue as needed      Patient Education and Home Exercises       Home Exercises Provided and Patient Education Provided     Education provided:   - Educated patient on the proper form and sequencing of all exercises performed today including going back over her HEP exercises as she had questions on repetitions and form as well as modifications   - Educated patient on proper way to perform scar mobilizations at home on her own to assist in decreasing adhesions   6/24/2022: reviewed hip precautions with patient. Advised to avoid wearing flip-flop style shoes for added safety and suggested to patient to wear a closed heel shoe as opposed to ambulating bare-footed.   6/27/2022: reviewed hip precautions with patient and educated to not cross her lower extremities to avoid external rotation/hip abduction    Written Home Exercises Provided: Patient instructed to cont prior HEP. Exercises  were reviewed and Katya was able to demonstrate them prior to the end of the session.  Katya demonstrated good  understanding of the education provided. See EMR under Patient Instructions for exercises provided during therapy sessions      ASSESSMENT     Patient required cues for performing exercises with slow controlled movement patterns. Initially, she complained about no pain but during this session, her pain increased to 2/10. She was able to perform straight leg raise with assistance with concentric motion and less assistance with eccentric movement.       Katya Is progressing well towards her goals.   Pt prognosis is Good.     Pt will continue to benefit from skilled outpatient physical therapy to address the deficits listed in the problem list box on initial evaluation, provide pt/family education and to maximize pt's level of independence in the home and community environment.     Pt's spiritual, cultural and educational needs considered and pt agreeable to plan of care and goals.     Anticipated barriers to physical therapy: chronicity of condition (hip pain)    Goals:   Short Term Goals:  6 weeks 6/13/2022   1. Pain: Patient will demonstrate improved pain by reports of less than or equal to 4/10 worst pain on the verbal rating scale in order to progress toward maximal functional ability and improve QOL.  MET 6/30/2022   2. Will assess next session and adjust accordingly Function: Patient will demonstrate improved function as indicated by a score of less than or equal to % impairment on FOTO.      3. Mobility: Patient will improve AROM to 75% of stated goals, listed in objective measures above, in order to progress towards independence with functional activities.      4. Strength: Patient will improve strength to 4-/5 in all muscle groups, listed in objective measures above, in order to progress towards independence with functional activities.      5. Gait: Patient will demonstrate improved gait mechanics  including more functional hip and knee flexion and extension in order to improve functional mobility, improve quality of life, and decrease risk of further injury or fall.      6. HEP: Patient will demonstrate independence with HEP in order to progress toward functional independence.        Long Term Goals:  12 weeks 6/13/2022   1. Pain: Patient will demonstrate improved pain by reports of less than or equal to 1/10 worst pain on the verbal rating scale in order to progress toward maximal functional ability and improve QOL.       2. Will assess next session and adjust accordingly Function: Patient will demonstrate improved function as indicated by a score of less than or equal to % impairment on FOTO.      3. Mobility: Patient will improve AROM to stated goals, listed in objective measures above, in order to return to maximal functional potential and improve quality of life.     4. Strength: Patient will improve strength to stated goals, listed in objective measures above, in order to improve functional independence and quality of life.     5. Gait: Patient will demonstrate normalized gait mechanics with minimal compensation in order to return to PLOF.     6. Patient will return to normal ADL's, IADL's, recreational activities, and work-related activities with less than or equal to 1/10 pain and maximal function.         PLAN     Continue POC and frequency as planned. Continue to progress range of motion and strengthening to tolerance      These services are reasonable and necessary for the conditions set forth above while under my care.    Jonathan Chambers, PTA

## 2022-07-12 ENCOUNTER — CLINICAL SUPPORT (OUTPATIENT)
Dept: REHABILITATION | Facility: HOSPITAL | Age: 87
End: 2022-07-12
Payer: MEDICARE

## 2022-07-12 DIAGNOSIS — R68.89 DECREASED STRENGTH, ENDURANCE, AND MOBILITY: Primary | ICD-10-CM

## 2022-07-12 DIAGNOSIS — Z74.09 DECREASED STRENGTH, ENDURANCE, AND MOBILITY: Primary | ICD-10-CM

## 2022-07-12 DIAGNOSIS — R53.1 DECREASED STRENGTH, ENDURANCE, AND MOBILITY: Primary | ICD-10-CM

## 2022-07-12 DIAGNOSIS — R26.89 FUNCTIONAL GAIT ABNORMALITY: ICD-10-CM

## 2022-07-12 PROCEDURE — 97112 NEUROMUSCULAR REEDUCATION: CPT

## 2022-07-12 PROCEDURE — 97110 THERAPEUTIC EXERCISES: CPT

## 2022-07-12 PROCEDURE — 97140 MANUAL THERAPY 1/> REGIONS: CPT

## 2022-07-12 NOTE — PROGRESS NOTES
[facility]   Physical Therapy Treatment Note     Name: Katya Dorantes  Clinic Number: [id number]    Therapy Diagnosis:   Encounter Diagnoses   Name Primary?    Decreased strength, endurance, and mobility Yes    Functional gait abnormality      Physician: Cain New MD    Visit Date: 7/12/2022    Physician Orders: PT Eval and Treat  Medical Diagnosis from Referral: Status post total hip replacement right  Evaluation Date: 6/13/2022  Authorization Period Expiration: 12/31/2022  Plan of Care Expiration: 9/13/2022  Progress Note Due: 7/13/2022  Visit # / Visits authorized: 6/20 (+1 for evaluation)  FOTO: 1/3    PTA Visit #: 0/5     Progress Note Due on 7/13/2022    Time In: 1035  Time Out: 1117  Total Billable Time: 40 minutes (denotes billable time)    Precautions: Standard and Anterior apprach s/p total hip replacement right 5/5/2022 (NO EXTENSION, EXTERNAL ROTATION, OR ABDUCTION)     SUBJECTIVE     Pt reports: that she is feeling pretty good today. Patient was a little late today due to having difficulty finding a parking spot.    She was compliant with home exercise program, performing daily.    Response to previous treatment: felt good after her last session.    Functional change: Able to linette pants without assistance from her upper extremities to lift her right leg into her pants, not walking with her straight cane. More stable with walking on unlevel rock surface in her back yard without her cane.     Pain: 0/10  Location: right hip     OBJECTIVE     Objective Measures updated at progress report unless specified.     Treatment     Katya received therapeutic exercises to develop strength, endurance, ROM, flexibility, posture and core stabilization for (22) minutes including:     Intervention Performed Today    NuStep  5 minutes level 2   Exercise bike x 5 minutes   Heel Slides   4 x 10 bilateral  (increased)   Hip Adduction Ball Squeezes x 3 minutes 5 second hold    Hip  Abduction with Band in Supine x 3 minutes green theraband    LE Marches Supine x 2x10 reps B        Hip Abduction/Adduction in Supine Small Range with PT Assist  x Right x10 reps   Quad sets  3 minutes   Glut sets  3 minutes   Straight leg raise  x 2 x 8 B with active range of motion eccentric control right lower extremity    Bridges  x 4  X 10 (increased)   Long arch quad   3 x 10 bilateral   Hip flexion  3 x 10 sitting     Plan for Next Visit: Continue to progress as tolerated and per surgical precautions     Katya participated in neuromuscular re-education activities to improve: Balance, Coordination, Kinesthetic, Proprioception and Posture for (10) minutes. The following activities were included:    Intervention Performed Today    Sit to Stands x 2x10 reps    Step Taps  x 4 inches x10 reps   Step Ups x 4 inches 2x10 reps                               Plan for Next Visit: Continue to progress functional strength     Katya received the following manual therapy techniques: Myofacial release, Soft tissue Mobilization and Scar Mobilizations were applied to the: right scar mobilizations and right upper leg musculature for (8) minutes, including:    Manual Intervention Performed Today    Soft Tissue Mobilization x right scar mobilizations   Joint Mobilizations     Mobilization with movement          Functional Dry Needling        Plan for Next Visit: Continue as needed      Patient Education and Home Exercises       Home Exercises Provided and Patient Education Provided     Education provided:   - Educated patient on the proper form and sequencing of all exercises performed today including going back over her HEP exercises as she had questions on repetitions and form as well as modifications   - Educated patient on proper way to perform scar mobilizations at home on her own to assist in decreasing adhesions   6/24/2022: reviewed hip precautions with patient. Advised to avoid wearing flip-flop style shoes for added  safety and suggested to patient to wear a closed heel shoe as opposed to ambulating bare-footed.   6/27/2022: reviewed hip precautions with patient and educated to not cross her lower extremities to avoid external rotation/hip abduction    Written Home Exercises Provided: Patient instructed to cont prior HEP. Exercises were reviewed and Katya was able to demonstrate them prior to the end of the session.  Katya demonstrated good  understanding of the education provided. See EMR under Patient Instructions for exercises provided during therapy sessions      ASSESSMENT     Patient noted with significant improvements in scar mobility and adhesions following manual therapy interventions. Patient noted with better ability to perform active hip flexion with less pain following manual therapy. Incorporated functional strengthening today to progress strength program with good fatigue with this.     Katya Is progressing well towards her goals.   Pt prognosis is Good.     Pt will continue to benefit from skilled outpatient physical therapy to address the deficits listed in the problem list box on initial evaluation, provide pt/family education and to maximize pt's level of independence in the home and community environment.     Pt's spiritual, cultural and educational needs considered and pt agreeable to plan of care and goals.     Anticipated barriers to physical therapy: chronicity of condition (hip pain)    Goals:   Short Term Goals:  6 weeks 6/13/2022   1. Pain: Patient will demonstrate improved pain by reports of less than or equal to 4/10 worst pain on the verbal rating scale in order to progress toward maximal functional ability and improve QOL.  MET 6/30/2022   2. Will assess next session and adjust accordingly Function: Patient will demonstrate improved function as indicated by a score of less than or equal to % impairment on FOTO.      3. Mobility: Patient will improve AROM to 75% of stated goals, listed in objective  measures above, in order to progress towards independence with functional activities.      4. Strength: Patient will improve strength to 4-/5 in all muscle groups, listed in objective measures above, in order to progress towards independence with functional activities.      5. Gait: Patient will demonstrate improved gait mechanics including more functional hip and knee flexion and extension in order to improve functional mobility, improve quality of life, and decrease risk of further injury or fall.      6. HEP: Patient will demonstrate independence with HEP in order to progress toward functional independence.        Long Term Goals:  12 weeks 6/13/2022   1. Pain: Patient will demonstrate improved pain by reports of less than or equal to 1/10 worst pain on the verbal rating scale in order to progress toward maximal functional ability and improve QOL.       2. Will assess next session and adjust accordingly Function: Patient will demonstrate improved function as indicated by a score of less than or equal to % impairment on FOTO.      3. Mobility: Patient will improve AROM to stated goals, listed in objective measures above, in order to return to maximal functional potential and improve quality of life.     4. Strength: Patient will improve strength to stated goals, listed in objective measures above, in order to improve functional independence and quality of life.     5. Gait: Patient will demonstrate normalized gait mechanics with minimal compensation in order to return to PLOF.     6. Patient will return to normal ADL's, IADL's, recreational activities, and work-related activities with less than or equal to 1/10 pain and maximal function.         PLAN     Continue POC and frequency as planned. Continue to progress range of motion and strengthening to tolerance      These services are reasonable and necessary for the conditions set forth above while under my care.    Leatha Jurado, PT, DPT

## 2022-07-15 ENCOUNTER — DOCUMENTATION ONLY (OUTPATIENT)
Dept: REHABILITATION | Facility: HOSPITAL | Age: 87
End: 2022-07-15
Payer: MEDICARE

## 2022-07-15 NOTE — PROGRESS NOTES
Ms. Aldana arrived to therapy reporting that she has been very dizzy the past few days and is unsure why. When asking if she has high blood pressure she reported that she does but thinks that she might have forgotten to take her blood pressure medication the last two days. Assessed her blood pressure at 190/98 and advised her to return home, take her blood pressure medication and retake her blood pressure within a few hours. Advised her to monitor her blood pressure over the next few days and if it does not improve then she should contact her cardiologist. Patient will call the clinic once she returns home to ensure she safely arrived there.    Leatha Jurado, PT, DPT

## 2022-07-18 ENCOUNTER — CLINICAL SUPPORT (OUTPATIENT)
Dept: REHABILITATION | Facility: HOSPITAL | Age: 87
End: 2022-07-18
Payer: MEDICARE

## 2022-07-18 DIAGNOSIS — R68.89 DECREASED STRENGTH, ENDURANCE, AND MOBILITY: Primary | ICD-10-CM

## 2022-07-18 DIAGNOSIS — Z74.09 DECREASED STRENGTH, ENDURANCE, AND MOBILITY: Primary | ICD-10-CM

## 2022-07-18 DIAGNOSIS — R26.89 FUNCTIONAL GAIT ABNORMALITY: ICD-10-CM

## 2022-07-18 DIAGNOSIS — R53.1 DECREASED STRENGTH, ENDURANCE, AND MOBILITY: Primary | ICD-10-CM

## 2022-07-18 PROCEDURE — 97112 NEUROMUSCULAR REEDUCATION: CPT

## 2022-07-18 PROCEDURE — 97110 THERAPEUTIC EXERCISES: CPT

## 2022-07-18 PROCEDURE — 97140 MANUAL THERAPY 1/> REGIONS: CPT

## 2022-07-18 NOTE — PROGRESS NOTES
[facility]   Physical Therapy Treatment Note     Name: Katya Dorantes  Clinic Number: [id number]    Therapy Diagnosis:   Encounter Diagnoses   Name Primary?    Decreased strength, endurance, and mobility Yes    Functional gait abnormality      Physician: Cain New MD    Visit Date: 7/18/2022    Physician Orders: PT Eval and Treat  Medical Diagnosis from Referral: Status post total hip replacement right  Evaluation Date: 6/13/2022  Authorization Period Expiration: 12/31/2022  Plan of Care Expiration: 9/13/2022  Progress Note Due: 7/13/2022  Visit # / Visits authorized: 6/20 (+1 for evaluation)  FOTO: 1/3    PTA Visit #: 0/5     Progress Note Due on 7/13/2022 (next session)    Time In: 1000  Time Out: 1038  Total Billable Time: 38 minutes (denotes billable time)    Precautions: Standard and Anterior apprach s/p total hip replacement right 5/5/2022 (NO EXTENSION, EXTERNAL ROTATION, OR ABDUCTION)     SUBJECTIVE     Pt reports: that she is feeling pretty good today. Patient reports that she is able to get in and out of the car better with less pain.     She was compliant with home exercise program, performing daily.    Response to previous treatment: felt good after her last session.    Functional change: Able to don pants without assistance from her upper extremities to lift her right leg into her pants, not walking with her straight cane. More stable with walking on unlevel rock surface in her back yard without her cane.     Pain: 0/10  Location: right hip     OBJECTIVE     Objective Measures updated at progress report unless specified.     Treatment     Katya received therapeutic exercises to develop strength, endurance, ROM, flexibility, posture and core stabilization for (15) minutes including:     Intervention Performed Today    NuStep  5 minutes level 2   Exercise bike  5 minutes   Heel Slides   4 x 10 bilateral  (increased)   Hip Adduction Ball Squeezes x 3 minutes 5 second  hold    Hip Abduction with Band in Supine x 3 minutes green theraband    LE Marches Supine x 3x10 reps B        Hip Abduction/Adduction in Supine Small Range with PT Assist   Right x10 reps   Quad sets  3 minutes   Glut sets  3 minutes   Straight leg raise  x 2 x 10 B with active range of motion eccentric control right lower extremity    Bridges   4  X 10 (increased)   Long arch quad   3 x 10 bilateral   Hip flexion  3 x 10 sitting     Plan for Next Visit: Continue to progress as tolerated and per surgical precautions     Katya participated in neuromuscular re-education activities to improve: Balance, Coordination, Kinesthetic, Proprioception and Posture for (15) minutes. The following activities were included:    Intervention Performed Today    Sit to Stands x 3x10 reps    Step Taps (increased height) x 6 inches x30 reps   Step Ups (increased height) x 6 inches 2x10 reps    Heel Raises x 3x10 reps                          Plan for Next Visit: Continue to progress functional strength     Katya received the following manual therapy techniques: Myofacial release, Soft tissue Mobilization and Scar Mobilizations were applied to the: right scar mobilizations and right upper leg musculature for (8) minutes, including:    Manual Intervention Performed Today    Soft Tissue Mobilization x right scar mobilizations   Joint Mobilizations     Mobilization with movement          Functional Dry Needling        Plan for Next Visit: Continue as needed      Patient Education and Home Exercises       Home Exercises Provided and Patient Education Provided     Education provided:   - Educated patient on the proper form and sequencing of all exercises performed today including going back over her HEP exercises as she had questions on repetitions and form as well as modifications   - Educated patient on proper way to perform scar mobilizations at home on her own to assist in decreasing adhesions   6/24/2022: reviewed hip precautions with  patient. Advised to avoid wearing flip-flop style shoes for added safety and suggested to patient to wear a closed heel shoe as opposed to ambulating bare-footed.   6/27/2022: reviewed hip precautions with patient and educated to not cross her lower extremities to avoid external rotation/hip abduction    Written Home Exercises Provided: Patient instructed to cont prior HEP. Exercises were reviewed and Katya was able to demonstrate them prior to the end of the session.  Katya demonstrated good  understanding of the education provided. See EMR under Patient Instructions for exercises provided during therapy sessions      ASSESSMENT     Patient noted with moderate improvements in scar mobility and adhesions following manual therapy interventions. Progressed endurance with all exercises as well as increased step height for all functional activities as well. Patient with a bit of muscular fatigue with all progressions today.     Katya Is progressing well towards her goals.   Pt prognosis is Good.     Pt will continue to benefit from skilled outpatient physical therapy to address the deficits listed in the problem list box on initial evaluation, provide pt/family education and to maximize pt's level of independence in the home and community environment.     Pt's spiritual, cultural and educational needs considered and pt agreeable to plan of care and goals.     Anticipated barriers to physical therapy: chronicity of condition (hip pain)    Goals:   Short Term Goals:  6 weeks 6/13/2022   1. Pain: Patient will demonstrate improved pain by reports of less than or equal to 4/10 worst pain on the verbal rating scale in order to progress toward maximal functional ability and improve QOL.  MET 6/30/2022   2. Will assess next session and adjust accordingly Function: Patient will demonstrate improved function as indicated by a score of less than or equal to % impairment on FOTO.      3. Mobility: Patient will improve AROM to 75%  of stated goals, listed in objective measures above, in order to progress towards independence with functional activities.      4. Strength: Patient will improve strength to 4-/5 in all muscle groups, listed in objective measures above, in order to progress towards independence with functional activities.      5. Gait: Patient will demonstrate improved gait mechanics including more functional hip and knee flexion and extension in order to improve functional mobility, improve quality of life, and decrease risk of further injury or fall.      6. HEP: Patient will demonstrate independence with HEP in order to progress toward functional independence.        Long Term Goals:  12 weeks 6/13/2022   1. Pain: Patient will demonstrate improved pain by reports of less than or equal to 1/10 worst pain on the verbal rating scale in order to progress toward maximal functional ability and improve QOL.       2. Will assess next session and adjust accordingly Function: Patient will demonstrate improved function as indicated by a score of less than or equal to % impairment on FOTO.      3. Mobility: Patient will improve AROM to stated goals, listed in objective measures above, in order to return to maximal functional potential and improve quality of life.     4. Strength: Patient will improve strength to stated goals, listed in objective measures above, in order to improve functional independence and quality of life.     5. Gait: Patient will demonstrate normalized gait mechanics with minimal compensation in order to return to PLOF.     6. Patient will return to normal ADL's, IADL's, recreational activities, and work-related activities with less than or equal to 1/10 pain and maximal function.         PLAN     Continue POC and frequency as planned. Continue to progress range of motion and strengthening to tolerance      These services are reasonable and necessary for the conditions set forth above while under my care.    Leatha DYE  Adrien, PT, DPT

## 2022-07-22 ENCOUNTER — CLINICAL SUPPORT (OUTPATIENT)
Dept: REHABILITATION | Facility: HOSPITAL | Age: 87
End: 2022-07-22
Payer: MEDICARE

## 2022-07-22 DIAGNOSIS — R53.1 DECREASED STRENGTH, ENDURANCE, AND MOBILITY: Primary | ICD-10-CM

## 2022-07-22 DIAGNOSIS — Z74.09 DECREASED STRENGTH, ENDURANCE, AND MOBILITY: Primary | ICD-10-CM

## 2022-07-22 DIAGNOSIS — R68.89 DECREASED STRENGTH, ENDURANCE, AND MOBILITY: Primary | ICD-10-CM

## 2022-07-22 DIAGNOSIS — R26.89 FUNCTIONAL GAIT ABNORMALITY: ICD-10-CM

## 2022-07-22 PROCEDURE — 97140 MANUAL THERAPY 1/> REGIONS: CPT

## 2022-07-22 PROCEDURE — 97535 SELF CARE MNGMENT TRAINING: CPT

## 2022-07-22 PROCEDURE — 97110 THERAPEUTIC EXERCISES: CPT

## 2022-07-22 NOTE — PROGRESS NOTES
[facility]   Physical Therapy Treatment Note     Name: Katya Dorantes  Clinic Number: [id number]    Therapy Diagnosis:   Encounter Diagnoses   Name Primary?    Decreased strength, endurance, and mobility Yes    Functional gait abnormality      Physician: Cain New MD    Visit Date: 7/22/2022    Physician Orders: PT Eval and Treat  Medical Diagnosis from Referral: Status post total hip replacement right  Evaluation Date: 6/13/2022  Authorization Period Expiration: 12/31/2022  Plan of Care Expiration: 9/13/2022  Progress Note Due: 7/13/2022  Visit # / Visits authorized: 8/20 (+1 for evaluation)  FOTO: 1/3    PTA Visit #: 0/5     Progress Note Due next session    Time In: 0907  Time Out: 0950  Total Billable Time: 38 minutes (denotes billable time)    Precautions: Standard and Anterior apprach s/p total hip replacement right 5/5/2022 (NO EXTENSION, EXTERNAL ROTATION, OR ABDUCTION)     SUBJECTIVE     Pt reports: that she is feeling very dizzy again today and almost called to cancel her appointment. Patient does not believe she took her blood pressure medication this morning either. BP upon entry with PT is 141/71. Patient would like to proceed with treatment.      She was compliant with home exercise program, performing daily.    Response to previous treatment: felt good after her last session.    Functional change: Able to don pants without assistance from her upper extremities to lift her right leg into her pants, not walking with her straight cane. More stable with walking on unlevel rock surface in her back yard without her cane.     Pain: 0/10  Location: right hip     OBJECTIVE     Objective Measures updated at progress report unless specified.     Treatment     Katya received therapeutic exercises to develop strength, endurance, ROM, flexibility, posture and core stabilization for (13) minutes including:     Intervention Performed Today    NuStep  5 minutes level 2   Exercise  bike x 5 minutes   Heel Slides   4 x 10 bilateral  (increased)   Hip Adduction Ball Squeezes  3 minutes 5 second hold    Hip Abduction with Band in Supine x 2 minutes blue theraband, unilateral 1 minutes B    LE Marches Supine  3x10 reps B        Hip Abduction/Adduction in Supine Small Range with PT Assist   Right x10 reps   Quad sets  3 minutes   Glut sets  3 minutes   Straight leg raise  x 2 x 10 R   Bridges   4  X 10 (increased)   Long arch quad   3 x 10 bilateral   Hip flexion  3 x 10 sitting     Plan for Next Visit: Continue to progress as tolerated and per surgical precautions     Self Care Education (10 minutes):  · Educated patient on contacting PCP about addressing recent dizziness as this is still unresolved. Patient voiced that she does not like current PCP so provided her with information to contact another PCP to be seen  · Educated patient on positional vertigo and how even though she is doing a particular maneuver at home, she might still need further assessment to assist with realigning ootoconia if this is what is going on  · Spoke with patient extensively about getting/setting up some sort of alarm to assist in reminding her to take her medication regularly. Patient will be discussing with family to see how they can best coordinate this for her    Katya participated in neuromuscular re-education activities to improve: Balance, Coordination, Kinesthetic, Proprioception and Posture for (0) minutes. The following activities were included:    Intervention Performed Today    Sit to Stands  3x10 reps    Step Taps (increased height)  6 inches x30 reps   Step Ups (increased height)  6 inches 2x10 reps    Heel Raises  3x10 reps                          Plan for Next Visit: Continue to progress functional strength     Katya received the following manual therapy techniques: Myofacial release, Soft tissue Mobilization and Scar Mobilizations were applied to the: right scar mobilizations and right upper leg  musculature for (15) minutes, including:    Manual Intervention Performed Today    Soft Tissue Mobilization x right scar mobilizations and adductors    Joint Mobilizations     Mobilization with movement          Functional Dry Needling        Plan for Next Visit: Continue as needed      Patient Education and Home Exercises     Home Exercises Provided and Patient Education Provided     Education provided:   - Educated patient on the proper form and sequencing of all exercises performed today including going back over her HEP exercises as she had questions on repetitions and form as well as modifications   - Educated patient on proper way to perform scar mobilizations at home on her own to assist in decreasing adhesions   6/24/2022: reviewed hip precautions with patient. Advised to avoid wearing flip-flop style shoes for added safety and suggested to patient to wear a closed heel shoe as opposed to ambulating bare-footed.   6/27/2022: reviewed hip precautions with patient and educated to not cross her lower extremities to avoid external rotation/hip abduction    Written Home Exercises Provided: Patient instructed to cont prior HEP. Exercises were reviewed and Katya was able to demonstrate them prior to the end of the session.  Katya demonstrated good  understanding of the education provided. See EMR under Patient Instructions for exercises provided during therapy sessions      ASSESSMENT     Patient with good tolerance to exercise reporting that she felt better after cycling on recumbent bike. Patient noted with no sensation of muscle activation on right side when doing supine hip abduction with theraband even when isolating during exercise. Although whenever coming to standing position she felt very significant muscular fatigue and was unable to perform sit to stand without assistance even with cueing and modification. Allowed patient to rest for a few minutes and then she was able to stand without assistance  following. Walked patient to car to ensure safety but emphasized the importance of utilizing assistive device when feeling less steady or when more dizzy to allow for better safety when ambulating. See additional education provided above.     Katya Is progressing well towards her goals.   Pt prognosis is Good.     Pt will continue to benefit from skilled outpatient physical therapy to address the deficits listed in the problem list box on initial evaluation, provide pt/family education and to maximize pt's level of independence in the home and community environment.     Pt's spiritual, cultural and educational needs considered and pt agreeable to plan of care and goals.     Anticipated barriers to physical therapy: chronicity of condition (hip pain)    Goals:   Short Term Goals:  6 weeks 6/13/2022   1. Pain: Patient will demonstrate improved pain by reports of less than or equal to 4/10 worst pain on the verbal rating scale in order to progress toward maximal functional ability and improve QOL.  MET 6/30/2022   2. Will assess next session and adjust accordingly Function: Patient will demonstrate improved function as indicated by a score of less than or equal to % impairment on FOTO.      3. Mobility: Patient will improve AROM to 75% of stated goals, listed in objective measures above, in order to progress towards independence with functional activities.      4. Strength: Patient will improve strength to 4-/5 in all muscle groups, listed in objective measures above, in order to progress towards independence with functional activities.      5. Gait: Patient will demonstrate improved gait mechanics including more functional hip and knee flexion and extension in order to improve functional mobility, improve quality of life, and decrease risk of further injury or fall.      6. HEP: Patient will demonstrate independence with HEP in order to progress toward functional independence.        Long Term Goals:  12 weeks  6/13/2022   1. Pain: Patient will demonstrate improved pain by reports of less than or equal to 1/10 worst pain on the verbal rating scale in order to progress toward maximal functional ability and improve QOL.       2. Will assess next session and adjust accordingly Function: Patient will demonstrate improved function as indicated by a score of less than or equal to % impairment on FOTO.      3. Mobility: Patient will improve AROM to stated goals, listed in objective measures above, in order to return to maximal functional potential and improve quality of life.     4. Strength: Patient will improve strength to stated goals, listed in objective measures above, in order to improve functional independence and quality of life.     5. Gait: Patient will demonstrate normalized gait mechanics with minimal compensation in order to return to PLOF.     6. Patient will return to normal ADL's, IADL's, recreational activities, and work-related activities with less than or equal to 1/10 pain and maximal function.         PLAN     Continue POC and frequency as planned. Continue to progress range of motion and strengthening to tolerance      These services are reasonable and necessary for the conditions set forth above while under my care.    Leatha Jurado, PT, DPT

## 2022-07-25 ENCOUNTER — CLINICAL SUPPORT (OUTPATIENT)
Dept: REHABILITATION | Facility: HOSPITAL | Age: 87
End: 2022-07-25
Payer: MEDICARE

## 2022-07-25 DIAGNOSIS — Z74.09 DECREASED STRENGTH, ENDURANCE, AND MOBILITY: Primary | ICD-10-CM

## 2022-07-25 DIAGNOSIS — R53.1 DECREASED STRENGTH, ENDURANCE, AND MOBILITY: Primary | ICD-10-CM

## 2022-07-25 DIAGNOSIS — R68.89 DECREASED STRENGTH, ENDURANCE, AND MOBILITY: Primary | ICD-10-CM

## 2022-07-25 DIAGNOSIS — R26.89 FUNCTIONAL GAIT ABNORMALITY: ICD-10-CM

## 2022-07-25 PROCEDURE — 97140 MANUAL THERAPY 1/> REGIONS: CPT

## 2022-07-25 PROCEDURE — 97110 THERAPEUTIC EXERCISES: CPT

## 2022-07-25 NOTE — PROGRESS NOTES
[facility]   Physical Therapy Treatment Note     Name: Katya Dorantes  Clinic Number: [id number]    Therapy Diagnosis:   Encounter Diagnoses   Name Primary?    Decreased strength, endurance, and mobility Yes    Functional gait abnormality      Physician: Cain New MD    Visit Date: 7/25/2022    Physician Orders: PT Eval and Treat  Medical Diagnosis from Referral: Status post total hip replacement right  Evaluation Date: 6/13/2022  Authorization Period Expiration: 12/31/2022  Plan of Care Expiration: 9/13/2022  Progress Note Due: 7/13/2022  Visit # / Visits authorized: 9/20 (+1 for evaluation)  FOTO: 1/3    PTA Visit #: 0/5     Progress Note Due next session    Time In: 0955  Time Out: 1033  Total Billable Time: 38 minutes (denotes billable time)    Precautions: Standard and Anterior apprach s/p total hip replacement right 5/5/2022 (NO EXTENSION, EXTERNAL ROTATION, OR ABDUCTION)     SUBJECTIVE     BP: 152/80 upon entry     Pt reports: that she continues to have dizziness and does not have an appointment with PCP until October as that was the earliest she could get. Patient reports that she is very concerned about this. Patient reports that she is having more difficulty recently getting up from standing position.     She was compliant with home exercise program, performing daily.    Response to previous treatment: felt good after her last session.    Functional change: Able to don pants without assistance from her upper extremities to lift her right leg into her pants, not walking with her straight cane. More stable with walking on unlevel rock surface in her back yard without her cane.     Pain: 0/10  Location: right hip     OBJECTIVE     Objective Measures updated at progress report unless specified.     Treatment     Katya received therapeutic exercises to develop strength, endurance, ROM, flexibility, posture and core stabilization for (14) minutes including:     Intervention  Performed Today    NuStep  5 minutes level 2   Exercise bike  5 minutes   Heel Slides   4 x 10 bilateral  (increased)   Hip Adduction x 5 second hold resisted by PT x15 reps    Hip Abduction with Band in Supine x blue theraband unilateral 1.5 minutes B    LE Marches Supine  3x10 reps B        Hip Abduction/Adduction in Supine Small Range with PT Assist   Right x10 reps   Quad sets  3 minutes   Glut sets  3 minutes   Straight leg raise  x 2 x 10 R   Bridges   4  X 10 (increased)   Long arch quad   3 x 10 bilateral   Hip flexion  3 x 10 sitting     Plan for Next Visit: Continue to progress as tolerated and per surgical precautions     Katya participated in neuromuscular re-education activities to improve: Balance, Coordination, Kinesthetic, Proprioception and Posture for (8) minutes. The following activities were included:    Intervention Performed Today    Sit to Stands x 3x10 reps    Step Taps (increased height)  6 inches x30 reps   Step Ups (increased height)  6 inches 2x10 reps    Heel Raises  3x10 reps                          Plan for Next Visit: Continue to progress functional strength     Katya received the following manual therapy techniques: Myofacial release, Soft tissue Mobilization and Scar Mobilizations were applied to the: right scar mobilizations and right upper leg musculature for (15) minutes, including:    Manual Intervention Performed Today    Soft Tissue Mobilization x right scar mobilizations and adductors    Joint Mobilizations     Mobilization with movement          Functional Dry Needling        Plan for Next Visit: Continue as needed      Patient Education and Home Exercises     Home Exercises Provided and Patient Education Provided     Education provided:   - Educated patient on the proper form and sequencing of all exercises performed today including going back over her HEP exercises as she had questions on repetitions and form as well as modifications   - Educated patient on proper way to  perform scar mobilizations at home on her own to assist in decreasing adhesions   6/24/2022: reviewed hip precautions with patient. Advised to avoid wearing flip-flop style shoes for added safety and suggested to patient to wear a closed heel shoe as opposed to ambulating bare-footed.   6/27/2022: reviewed hip precautions with patient and educated to not cross her lower extremities to avoid external rotation/hip abduction    Written Home Exercises Provided: Patient instructed to cont prior HEP. Exercises were reviewed and Katya was able to demonstrate them prior to the end of the session.  Katya demonstrated good  understanding of the education provided. See EMR under Patient Instructions for exercises provided during therapy sessions      ASSESSMENT     Patient tolerated treatment well today, blood pressure was a little more elevated but still believe her dizziness is coming from vestibular dysfunction as nystagmus was observed today by PT. Patient noted with more muscular fatigue today with exercises requiring more frequent rest breaks. Patient noted with better ability though to perform sit to stands without PT assistance today even from lower surface.     Katya Is progressing well towards her goals.   Pt prognosis is Good.     Pt will continue to benefit from skilled outpatient physical therapy to address the deficits listed in the problem list box on initial evaluation, provide pt/family education and to maximize pt's level of independence in the home and community environment.     Pt's spiritual, cultural and educational needs considered and pt agreeable to plan of care and goals.     Anticipated barriers to physical therapy: chronicity of condition (hip pain)    Goals:   Short Term Goals:  6 weeks 6/13/2022   1. Pain: Patient will demonstrate improved pain by reports of less than or equal to 4/10 worst pain on the verbal rating scale in order to progress toward maximal functional ability and improve QOL.  MET  6/30/2022   2. Will assess next session and adjust accordingly Function: Patient will demonstrate improved function as indicated by a score of less than or equal to % impairment on FOTO.      3. Mobility: Patient will improve AROM to 75% of stated goals, listed in objective measures above, in order to progress towards independence with functional activities.      4. Strength: Patient will improve strength to 4-/5 in all muscle groups, listed in objective measures above, in order to progress towards independence with functional activities.      5. Gait: Patient will demonstrate improved gait mechanics including more functional hip and knee flexion and extension in order to improve functional mobility, improve quality of life, and decrease risk of further injury or fall.      6. HEP: Patient will demonstrate independence with HEP in order to progress toward functional independence.        Long Term Goals:  12 weeks 6/13/2022   1. Pain: Patient will demonstrate improved pain by reports of less than or equal to 1/10 worst pain on the verbal rating scale in order to progress toward maximal functional ability and improve QOL.       2. Will assess next session and adjust accordingly Function: Patient will demonstrate improved function as indicated by a score of less than or equal to % impairment on FOTO.      3. Mobility: Patient will improve AROM to stated goals, listed in objective measures above, in order to return to maximal functional potential and improve quality of life.     4. Strength: Patient will improve strength to stated goals, listed in objective measures above, in order to improve functional independence and quality of life.     5. Gait: Patient will demonstrate normalized gait mechanics with minimal compensation in order to return to PLOF.     6. Patient will return to normal ADL's, IADL's, recreational activities, and work-related activities with less than or equal to 1/10 pain and maximal function.          PLAN     Continue POC and frequency as planned. Continue to progress range of motion and strengthening to tolerance      These services are reasonable and necessary for the conditions set forth above while under my care.    Leatha Jurado, PT, DPT

## 2022-07-27 ENCOUNTER — CLINICAL SUPPORT (OUTPATIENT)
Dept: REHABILITATION | Facility: HOSPITAL | Age: 87
End: 2022-07-27
Payer: MEDICARE

## 2022-07-27 DIAGNOSIS — Z74.09 DECREASED STRENGTH, ENDURANCE, AND MOBILITY: Primary | ICD-10-CM

## 2022-07-27 DIAGNOSIS — R68.89 DECREASED STRENGTH, ENDURANCE, AND MOBILITY: Primary | ICD-10-CM

## 2022-07-27 DIAGNOSIS — R53.1 DECREASED STRENGTH, ENDURANCE, AND MOBILITY: Primary | ICD-10-CM

## 2022-07-27 DIAGNOSIS — R26.89 FUNCTIONAL GAIT ABNORMALITY: ICD-10-CM

## 2022-07-27 PROCEDURE — 97110 THERAPEUTIC EXERCISES: CPT

## 2022-07-27 PROCEDURE — 97140 MANUAL THERAPY 1/> REGIONS: CPT

## 2022-07-27 NOTE — PROGRESS NOTES
[facility]   Physical Therapy Treatment Note + Progress Note    Name: Katya Dorantes  Clinic Number: [id number]    Therapy Diagnosis:   Encounter Diagnoses   Name Primary?    Decreased strength, endurance, and mobility Yes    Functional gait abnormality      Physician: Cain New MD    Visit Date: 7/27/2022    Physician Orders: PT Eval and Treat  Medical Diagnosis from Referral: Status post total hip replacement right  Evaluation Date: 6/13/2022  Authorization Period Expiration: 12/31/2022  Plan of Care Expiration: 9/13/2022  Progress Note Due: 8/27/2022  Visit # / Visits authorized: 10/20 (+1 for evaluation)  FOTO: 1/3    PTA Visit #: 0/5     Progress Note Due: 8/27/2022    Time In: 0903  Time Out: 0945  Total Billable Time: 40 minutes (denotes billable time)    Precautions: Standard and Anterior apprach s/p total hip replacement right 5/5/2022 (NO EXTENSION, EXTERNAL ROTATION, OR ABDUCTION)     SUBJECTIVE     Pt reports: that she took her blood pressure medication this morning and is still a little bit dizzy. Patient overall feels that she has improved a lot since beginning therapy. Patient has a follow up appointment with MD tomorrow.     She was compliant with home exercise program, performing daily.    Response to previous treatment: felt good after her last session.    Functional change: Able to don pants without assistance from her upper extremities to lift her right leg into her pants, not walking with her straight cane. More stable with walking on unlevel rock surface in her back yard without her cane.     Pain: 0/10  Location: right hip     OBJECTIVE     Objective Measures updated at progress report unless specified.     Sensation:  Sensation is intact to light touch  Posture:  Pt presents with postural abnormalities which include: forward head, rounded shoulders  and forward flexed trunk posturing with lack of neutral hip extension  Palpation: Improving adhesions and  rigidity noted at incision site on right anterior hip.   Gait Analysis: The patient ambulated with the following assistive device: straight cane being utilized more due to dizziness throughout gait cycle     (x = not tested due to pain and/or inability to obtain test position)     Range of Motion/Strength:      Hip Right  7/27/2022 Left  6/13/2022 Pain/Dysfunction with Movement Goal   AROM           Flexion (120)  110  120 Pain in right hip 120 No pain   Extension (30)  0  0 No pain 15 No pain   Abduction (45)  22  40 Stretching in adductors R 40 No pain   IR (45)  35  40 No pain 40 No pain   ER (45)  35  40 No pain 40 No pain      Knee Right  7/27/2022 Left  6/13/2022 Pain/Dysfunction with Movement Goal   AROM       AROM   Flexion (135)  135  145 No pain 145 No pain   Extension (0)  0  0 No  pain -      Assessed strength in modified seated position   L/E MMT Right Left Pain/Dysfunction with Movement Goal   Hip Flexion  4-/5 4-/5 No pain 4+/5 B   Hip Extension  x x  No pain 4+/5 B   Hip Abduction  3/5 3/5 No pain 4+/5 B   Hip Adduction 3/5 3/5 No pain 4+/5 B   Knee Extension 4/5 4/5 No pain 5/5 B   Knee Flexion 3+/5 3+/5 No pain 5/5 B   Hip IR 4-/5 4/5 No pain 4+/5 B   Hip ER 4-/5 4/5 No pain 4+/5 B   Ankle DF 4/5 4/5  No pain 5/5 B   Ankle PF 4/5 4/5  No pain 5/5 B      30 Second Sit to Stand Test: 10 repetitions with use of bilateral upper extremities        Treatment     Katya received therapeutic exercises to develop strength, endurance, ROM, flexibility, posture and core stabilization for (28) minutes including:     Intervention Performed Today    NuStep  5 minutes level 2   Exercise bike x 5 minutes level 1   Heel Slides   4 x 10 bilateral  (increased)   Hip Adduction x 5 second hold resisted by PT x30 reps    Hip Abduction with Band in Supine x blue theraband unilateral 3 minutes    LE Marches Supine  3x10 reps B   Re-Assessed Objective Measurements x    Hip Abduction/Adduction in Supine Small Range with PT  Assist   Right x10 reps   Quad sets  3 minutes   Glut sets  3 minutes   Straight leg raise  x 2 x 10 R, 3x10 reps L   Bridges   4  X 10 (increased)   Long arch quad   3 x 10 bilateral   Hip flexion  3 x 10 sitting     Plan for Next Visit: Continue to progress as tolerated and per surgical precautions     Katya participated in neuromuscular re-education activities to improve: Balance, Coordination, Kinesthetic, Proprioception and Posture for (0) minutes. The following activities were included:    Intervention Performed Today    Sit to Stands  3x10 reps    Step Taps (increased height)  6 inches x30 reps   Step Ups (increased height)  6 inches 2x10 reps    Heel Raises  3x10 reps                          Plan for Next Visit: Continue to progress functional strength     Katya received the following manual therapy techniques: Myofacial release, Soft tissue Mobilization and Scar Mobilizations were applied to the: right scar mobilizations and right upper leg musculature for (12) minutes, including:    Manual Intervention Performed Today    Soft Tissue Mobilization x right scar mobilizations and adductors    Joint Mobilizations     Mobilization with movement          Functional Dry Needling        Plan for Next Visit: Continue as needed      Patient Education and Home Exercises     Home Exercises Provided and Patient Education Provided     Education provided:   - Educated patient on the proper form and sequencing of all exercises performed today including going back over her HEP exercises as she had questions on repetitions and form as well as modifications   - Educated patient on proper way to perform scar mobilizations at home on her own to assist in decreasing adhesions   6/24/2022: reviewed hip precautions with patient. Advised to avoid wearing flip-flop style shoes for added safety and suggested to patient to wear a closed heel shoe as opposed to ambulating bare-footed.   6/27/2022: reviewed hip precautions with patient  and educated to not cross her lower extremities to avoid external rotation/hip abduction    Written Home Exercises Provided: Patient instructed to cont prior HEP. Exercises were reviewed and Katya was able to demonstrate them prior to the end of the session.  Katya demonstrated good  understanding of the education provided. See EMR under Patient Instructions for exercises provided during therapy sessions      ASSESSMENT     Patient tolerated treatment well today. Improving scar mobility with less significant adhesions present following manual therapy interventions. Patient noted with improving range of motion and strength since initial evaluation and demonstrates above average scores for 30 Second Sit to Stands although requiring upper extremity use. Patient will continue to benefit from skilled Physical Therapy to address the remaining impairments present.     Katya Is progressing well towards her goals.   Pt prognosis is Good.     Pt will continue to benefit from skilled outpatient physical therapy to address the deficits listed in the problem list box on initial evaluation, provide pt/family education and to maximize pt's level of independence in the home and community environment.     Pt's spiritual, cultural and educational needs considered and pt agreeable to plan of care and goals.     Anticipated barriers to physical therapy: chronicity of condition (hip pain)    Goals:   Short Term Goals:  6 weeks 7/27/2022   1. Pain: Patient will demonstrate improved pain by reports of less than or equal to 4/10 worst pain on the verbal rating scale in order to progress toward maximal functional ability and improve QOL.  MET 6/30/2022   2. Function: Patient will demonstrate improved function as indicated by a score of less than or equal to 50% impairment on FOTO.   Met   3. Mobility: Patient will improve AROM to 75% of stated goals, listed in objective measures above, in order to progress towards independence with  functional activities.   Partially Met   4. Strength: Patient will improve strength to 4-/5 in all muscle groups, listed in objective measures above, in order to progress towards independence with functional activities.   Progressing   5. Gait: Patient will demonstrate improved gait mechanics including more functional hip and knee flexion and extension in order to improve functional mobility, improve quality of life, and decrease risk of further injury or fall.   Progressing   6. HEP: Patient will demonstrate independence with HEP in order to progress toward functional independence.  Met      Long Term Goals:  12 weeks 7/27/2022   1. Pain: Patient will demonstrate improved pain by reports of less than or equal to 1/10 worst pain on the verbal rating scale in order to progress toward maximal functional ability and improve QOL.    Progressing   2. Function: Patient will demonstrate improved function as indicated by a score of less than or equal to 39% impairment on FOTO.   Progressing   3. Mobility: Patient will improve AROM to stated goals, listed in objective measures above, in order to return to maximal functional potential and improve quality of life.  Progressing   4. Strength: Patient will improve strength to stated goals, listed in objective measures above, in order to improve functional independence and quality of life.  Progressing   5. Gait: Patient will demonstrate normalized gait mechanics with minimal compensation in order to return to PLOF.  Progressing   6. Patient will return to normal ADL's, IADL's, recreational activities, and work-related activities with less than or equal to 1/10 pain and maximal function.   Progressing      PLAN     Continue POC and frequency as planned. Continue to progress range of motion and strengthening to tolerance      These services are reasonable and necessary for the conditions set forth above while under my care.    Leatha Jurado, PT, DPT

## 2022-08-01 ENCOUNTER — CLINICAL SUPPORT (OUTPATIENT)
Dept: REHABILITATION | Facility: HOSPITAL | Age: 87
End: 2022-08-01
Payer: MEDICARE

## 2022-08-01 DIAGNOSIS — Z74.09 DECREASED STRENGTH, ENDURANCE, AND MOBILITY: Primary | ICD-10-CM

## 2022-08-01 DIAGNOSIS — R53.1 DECREASED STRENGTH, ENDURANCE, AND MOBILITY: Primary | ICD-10-CM

## 2022-08-01 DIAGNOSIS — R68.89 DECREASED STRENGTH, ENDURANCE, AND MOBILITY: Primary | ICD-10-CM

## 2022-08-01 DIAGNOSIS — R26.89 FUNCTIONAL GAIT ABNORMALITY: ICD-10-CM

## 2022-08-01 PROCEDURE — 97112 NEUROMUSCULAR REEDUCATION: CPT

## 2022-08-01 PROCEDURE — 97140 MANUAL THERAPY 1/> REGIONS: CPT

## 2022-08-01 PROCEDURE — 97110 THERAPEUTIC EXERCISES: CPT

## 2022-08-01 NOTE — PROGRESS NOTES
[facility]   Physical Therapy Treatment Note    Name: Katya Dorantes  Clinic Number: [id number]    Therapy Diagnosis:   Encounter Diagnoses   Name Primary?    Decreased strength, endurance, and mobility Yes    Functional gait abnormality      Physician: Cain New MD    Visit Date: 8/1/2022    Physician Orders: PT Eval and Treat  Medical Diagnosis from Referral: Status post total hip replacement right  Evaluation Date: 6/13/2022  Authorization Period Expiration: 12/31/2022  Plan of Care Expiration: 9/13/2022  Progress Note Due: 8/27/2022  Visit # / Visits authorized: 11/20 (+1 for evaluation)  FOTO: 1/3    PTA Visit #: 0/5     Progress Note Due: 8/27/2022    Time In: 1332  Time Out: 1418  Total Billable Time: 42 minutes (denotes billable time)    Precautions: Standard and Anterior apprach s/p total hip replacement right 5/5/2022 (NO EXTENSION, EXTERNAL ROTATION, OR ABDUCTION)     SUBJECTIVE     Pt reports: that this morning she could hardly get around but went to see the ENT this morning and is now feeling better. Patient was provided with new maneuver/exercise to try for vertigo and is planning to try it out when she gets home today. Patient reports that she feels like her leg has gotten a lot stronger but is still having difficulty/weakness lifting her leg and is still unable to cross her right lower extremity over which she finds is a problem as well. Patient would like to continue therapy for a little bit longer to address her remaining deficits.     She was compliant with home exercise program, performing daily.    Response to previous treatment: felt good after her last session.    Functional change: Able to don pants without assistance from her upper extremities to lift her right leg into her pants, not walking with her straight cane. More stable with walking on unlevel rock surface in her back yard without her cane.     Pain: 0/10  Location: right hip     OBJECTIVE      Objective Measures updated at progress report unless specified.     Treatment     Katya received therapeutic exercises to develop strength, endurance, ROM, flexibility, posture and core stabilization for (24) minutes including:     Intervention Performed Today    NuStep  5 minutes level 2   Exercise bike x 5 minutes level 1   Heel Slides   4 x 10 bilateral  (increased)   Hip Adduction x 5 second hold resisted by PT x30 reps    Hip Abduction with Band in Supine x blue theraband unilateral 1 minutes B, 1 minute in unison   LE Marches Sitting (added weight today) x 3x10 reps B 3 lbs   Re-Assessed Objective Measurements     Hip Abduction/Adduction in Supine Small Range with PT Assist   Right x10 reps   Quad sets  3 minutes   Glut sets  3 minutes   Straight leg raise  x 2 x 10 R, 3x10 reps L   Bridges   4  X 10 (increased)   Long arch quad   3 x 10 bilateral   Hip flexion  3 x 10 sitting     Plan for Next Visit: Continue to progress as tolerated and per surgical precautions     Katya participated in neuromuscular re-education activities to improve: Balance, Coordination, Kinesthetic, Proprioception and Posture for (8) minutes. The following activities were included:    Intervention Performed Today    Sit to Stands x 3x10 reps rest breaks required intermittently   Step Taps (increased height)  6 inches x30 reps   Step Ups (increased height)  6 inches 2x10 reps    Heel Raises  3x10 reps                          Plan for Next Visit: Continue to progress functional strength     Katya received the following manual therapy techniques: Myofacial release, Soft tissue Mobilization and Scar Mobilizations were applied to the: right scar mobilizations and right upper leg musculature for (10) minutes, including:    Manual Intervention Performed Today    Soft Tissue Mobilization x right scar mobilizations and adductors    Joint Mobilizations     Mobilization with movement          Functional Dry Needling        Plan for Next  Visit: Continue as needed      Patient Education and Home Exercises     Home Exercises Provided and Patient Education Provided     Education provided:   - Educated patient on the proper form and sequencing of all exercises performed today including going back over her HEP exercises as she had questions on repetitions and form as well as modifications   - Educated patient on proper way to perform scar mobilizations at home on her own to assist in decreasing adhesions   6/24/2022: reviewed hip precautions with patient. Advised to avoid wearing flip-flop style shoes for added safety and suggested to patient to wear a closed heel shoe as opposed to ambulating bare-footed.   6/27/2022: reviewed hip precautions with patient and educated to not cross her lower extremities to avoid external rotation/hip abduction    Written Home Exercises Provided: Patient instructed to cont prior HEP. Exercises were reviewed and Katya was able to demonstrate them prior to the end of the session.  Katya demonstrated good  understanding of the education provided. See EMR under Patient Instructions for exercises provided during therapy sessions      ASSESSMENT     Patient tolerated treatment well today. Improving scar mobility noted with each session. Improving endurance and range of motion with straight leg raises. Improving endurance with hip strengthening as well as sit to stands.     Katya Is progressing well towards her goals.   Pt prognosis is Good.     Pt will continue to benefit from skilled outpatient physical therapy to address the deficits listed in the problem list box on initial evaluation, provide pt/family education and to maximize pt's level of independence in the home and community environment.     Pt's spiritual, cultural and educational needs considered and pt agreeable to plan of care and goals.     Anticipated barriers to physical therapy: chronicity of condition (hip pain)    Goals:   Short Term Goals:  6 weeks 7/27/2022    1. Pain: Patient will demonstrate improved pain by reports of less than or equal to 4/10 worst pain on the verbal rating scale in order to progress toward maximal functional ability and improve QOL.  MET 6/30/2022   2. Function: Patient will demonstrate improved function as indicated by a score of less than or equal to 50% impairment on FOTO.   Met   3. Mobility: Patient will improve AROM to 75% of stated goals, listed in objective measures above, in order to progress towards independence with functional activities.   Partially Met   4. Strength: Patient will improve strength to 4-/5 in all muscle groups, listed in objective measures above, in order to progress towards independence with functional activities.   Progressing   5. Gait: Patient will demonstrate improved gait mechanics including more functional hip and knee flexion and extension in order to improve functional mobility, improve quality of life, and decrease risk of further injury or fall.   Progressing   6. HEP: Patient will demonstrate independence with HEP in order to progress toward functional independence.  Met      Long Term Goals:  12 weeks 7/27/2022   1. Pain: Patient will demonstrate improved pain by reports of less than or equal to 1/10 worst pain on the verbal rating scale in order to progress toward maximal functional ability and improve QOL.    Progressing   2. Function: Patient will demonstrate improved function as indicated by a score of less than or equal to 39% impairment on FOTO.   Progressing   3. Mobility: Patient will improve AROM to stated goals, listed in objective measures above, in order to return to maximal functional potential and improve quality of life.  Progressing   4. Strength: Patient will improve strength to stated goals, listed in objective measures above, in order to improve functional independence and quality of life.  Progressing   5. Gait: Patient will demonstrate normalized gait mechanics with minimal compensation  in order to return to PLOF.  Progressing   6. Patient will return to normal ADL's, IADL's, recreational activities, and work-related activities with less than or equal to 1/10 pain and maximal function.   Progressing      PLAN     Continue POC and frequency as planned. Continue to progress range of motion and strengthening to tolerance      These services are reasonable and necessary for the conditions set forth above while under my care.    Leatha Jurado, PT, DPT

## 2022-08-11 ENCOUNTER — CLINICAL SUPPORT (OUTPATIENT)
Dept: REHABILITATION | Facility: HOSPITAL | Age: 87
End: 2022-08-11
Payer: MEDICARE

## 2022-08-11 DIAGNOSIS — R26.89 FUNCTIONAL GAIT ABNORMALITY: ICD-10-CM

## 2022-08-11 DIAGNOSIS — R53.1 DECREASED STRENGTH, ENDURANCE, AND MOBILITY: Primary | ICD-10-CM

## 2022-08-11 DIAGNOSIS — R68.89 DECREASED STRENGTH, ENDURANCE, AND MOBILITY: Primary | ICD-10-CM

## 2022-08-11 DIAGNOSIS — Z74.09 DECREASED STRENGTH, ENDURANCE, AND MOBILITY: Primary | ICD-10-CM

## 2022-08-11 PROCEDURE — 97110 THERAPEUTIC EXERCISES: CPT | Mod: CQ

## 2022-08-11 NOTE — PROGRESS NOTES
[facility]   Physical Therapy Treatment Note    Name: Katya Dorantes  Clinic Number: [id number]    Therapy Diagnosis:   Encounter Diagnoses   Name Primary?    Decreased strength, endurance, and mobility Yes    Functional gait abnormality      Physician: Cain New MD    Visit Date: 8/11/2022    Physician Orders: PT Eval and Treat  Medical Diagnosis from Referral: Status post total hip replacement right  Evaluation Date: 6/13/2022  Authorization Period Expiration: 12/31/2022  Plan of Care Expiration: 9/13/2022  Progress Note Due: 8/27/2022  Visit # / Visits authorized: 12/20 (+1 for evaluation)  FOTO: 1/3    PTA Visit #: 1/5     Progress Note Due: 8/27/2022    Time In: 1415  Time Out: 1500  Total Billable Time: 41 minutes (denotes billable time)    Precautions: Standard and Anterior apprach s/p total hip replacement right 5/5/2022 (NO EXTENSION, EXTERNAL ROTATION, OR ABDUCTION)     SUBJECTIVE     Pt reports: she has been compliant with her exercise she got from ENT to correct her dizziness. No dizziness today. Has been working in her yard today before this session.     She was compliant with home exercise program, performing daily.    Response to previous treatment: felt good after her last session.    Functional change: Able to don pants without assistance from her upper extremities to lift her right leg into her pants, not walking with her straight cane. More stable with walking on unlevel rock surface in her back yard without her cane.     Pain: 0/10  Location: right hip (no pain today)    OBJECTIVE     Objective Measures updated at progress report unless specified.     Treatment     Katya received therapeutic exercises to develop strength, endurance, ROM, flexibility, posture and core stabilization for (39) minutes including:     Intervention Performed Today    NuStep x 5 minutes level 5 for strength/endurance   Exercise bike  5 minutes level 1   Heel Slides  x 4 x 10 bilateral      Hip Adduction x 5 second hold ball between knees x 30     Hip Abduction with Band in Supine x blue theraband unilateral 1 minutes B, 1 minute in unison   LE Marches Sitting (added weight today) x 3x10 reps B 3 pounds   Re-Assessed Objective Measurements     Hip Abduction/Adduction in Supine Small Range with PT Assist   Right x10 reps   Quad sets  3 minutes   Glut sets  3 minutes   Straight leg raise  x 2 x 10 Right, 3x10  Left   Bridges  x 4  X 10    Long arch quad  x 3 x 10 bilateral 2 pounds (increased)   Hip flexion  3 x 10 sitting 2 pounds (increased)     Plan for Next Visit: Continue to progress as tolerated and per surgical precautions     Katya participated in neuromuscular re-education activities to improve: Balance, Coordination, Kinesthetic, Proprioception and Posture for (2) minutes. The following activities were included:    Intervention Performed Today    Sit to Stands x 3x10 reps rest breaks required intermittently   Step Taps (increased height)  6 inches x30 reps   Step Ups (increased height)  6 inches 2x10 reps    Heel Raises  3x10 reps                          Plan for Next Visit: Continue to progress functional strength     Katya received the following manual therapy techniques: Myofacial release, Soft tissue Mobilization and Scar Mobilizations were applied to the: right scar mobilizations and right upper leg musculature for (0) minutes, including:    Manual Intervention Performed Today    Soft Tissue Mobilization  right scar mobilizations and adductors    Joint Mobilizations     Mobilization with movement          Functional Dry Needling        Plan for Next Visit: Continue as needed      Patient Education and Home Exercises     Home Exercises Provided and Patient Education Provided     Education provided:   - Educated patient on the proper form and sequencing of all exercises performed today including going back over her HEP exercises as she had questions on repetitions and form as well as  modifications   - Educated patient on proper way to perform scar mobilizations at home on her own to assist in decreasing adhesions   6/24/2022: reviewed hip precautions with patient. Advised to avoid wearing flip-flop style shoes for added safety and suggested to patient to wear a closed heel shoe as opposed to ambulating bare-footed.   6/27/2022: reviewed hip precautions with patient and educated to not cross her lower extremities to avoid external rotation/hip abduction    Written Home Exercises Provided: Patient instructed to cont prior HEP. Exercises were reviewed and Katya was able to demonstrate them prior to the end of the session.  Katya demonstrated good  understanding of the education provided. See EMR under Patient Instructions for exercises provided during therapy sessions      ASSESSMENT     Patient tolerated treatment well today. Improving scar mobility noted since PTA has worked with her last. Improving endurance with hip strengthening as well as sit to stands. She was able to be progressed today with the addition of resistance (see above for details).     Katya Is progressing well towards her goals.   Pt prognosis is Good.     Pt will continue to benefit from skilled outpatient physical therapy to address the deficits listed in the problem list box on initial evaluation, provide pt/family education and to maximize pt's level of independence in the home and community environment.     Pt's spiritual, cultural and educational needs considered and pt agreeable to plan of care and goals.     Anticipated barriers to physical therapy: chronicity of condition (hip pain)    Goals:   Short Term Goals:  6 weeks 7/27/2022   1. Pain: Patient will demonstrate improved pain by reports of less than or equal to 4/10 worst pain on the verbal rating scale in order to progress toward maximal functional ability and improve QOL.  MET 6/30/2022   2. Function: Patient will demonstrate improved function as indicated by a  score of less than or equal to 50% impairment on FOTO.   Met   3. Mobility: Patient will improve AROM to 75% of stated goals, listed in objective measures above, in order to progress towards independence with functional activities.   Partially Met   4. Strength: Patient will improve strength to 4-/5 in all muscle groups, listed in objective measures above, in order to progress towards independence with functional activities.   Progressing   5. Gait: Patient will demonstrate improved gait mechanics including more functional hip and knee flexion and extension in order to improve functional mobility, improve quality of life, and decrease risk of further injury or fall.   Progressing   6. HEP: Patient will demonstrate independence with HEP in order to progress toward functional independence.  Met      Long Term Goals:  12 weeks 7/27/2022   1. Pain: Patient will demonstrate improved pain by reports of less than or equal to 1/10 worst pain on the verbal rating scale in order to progress toward maximal functional ability and improve QOL.    Progressing   2. Function: Patient will demonstrate improved function as indicated by a score of less than or equal to 39% impairment on FOTO.   Progressing   3. Mobility: Patient will improve AROM to stated goals, listed in objective measures above, in order to return to maximal functional potential and improve quality of life.  Progressing   4. Strength: Patient will improve strength to stated goals, listed in objective measures above, in order to improve functional independence and quality of life.  Progressing   5. Gait: Patient will demonstrate normalized gait mechanics with minimal compensation in order to return to PLOF.  Progressing   6. Patient will return to normal ADL's, IADL's, recreational activities, and work-related activities with less than or equal to 1/10 pain and maximal function.   Progressing      PLAN     Continue POC and frequency as planned. Continue to progress  range of motion and strengthening to tolerance      These services are reasonable and necessary for the conditions set forth above while under my care.    Jonathan Chambers, PTA

## 2022-08-12 ENCOUNTER — DOCUMENTATION ONLY (OUTPATIENT)
Dept: REHABILITATION | Facility: HOSPITAL | Age: 87
End: 2022-08-12
Payer: MEDICARE

## 2022-08-18 ENCOUNTER — TELEPHONE (OUTPATIENT)
Dept: REHABILITATION | Facility: HOSPITAL | Age: 87
End: 2022-08-18
Payer: MEDICARE

## 2022-08-18 NOTE — TELEPHONE ENCOUNTER
Tried to call patient about missed appointment today but was unable to reach patient or leave voicemail.    Leatha Jurado, PT, DPT

## 2022-08-23 ENCOUNTER — CLINICAL SUPPORT (OUTPATIENT)
Dept: REHABILITATION | Facility: HOSPITAL | Age: 87
End: 2022-08-23
Payer: MEDICARE

## 2022-08-23 DIAGNOSIS — R26.89 FUNCTIONAL GAIT ABNORMALITY: ICD-10-CM

## 2022-08-23 DIAGNOSIS — R68.89 DECREASED STRENGTH, ENDURANCE, AND MOBILITY: Primary | ICD-10-CM

## 2022-08-23 DIAGNOSIS — R53.1 DECREASED STRENGTH, ENDURANCE, AND MOBILITY: Primary | ICD-10-CM

## 2022-08-23 DIAGNOSIS — Z74.09 DECREASED STRENGTH, ENDURANCE, AND MOBILITY: Primary | ICD-10-CM

## 2022-08-23 PROCEDURE — 97112 NEUROMUSCULAR REEDUCATION: CPT

## 2022-08-23 PROCEDURE — 97110 THERAPEUTIC EXERCISES: CPT

## 2022-08-23 NOTE — PROGRESS NOTES
[facility]   Physical Therapy Treatment Note    Name: Katya Dorantes  Clinic Number: [id number]    Therapy Diagnosis:   Encounter Diagnoses   Name Primary?    Decreased strength, endurance, and mobility Yes    Functional gait abnormality      Physician: Cain New MD    Visit Date: 8/23/2022    Physician Orders: PT Eval and Treat  Medical Diagnosis from Referral: Status post total hip replacement right  Evaluation Date: 6/13/2022  Authorization Period Expiration: 12/31/2022  Plan of Care Expiration: 9/13/2022  Progress Note Due: 8/27/2022  Visit # / Visits authorized: 13/20 (+1 for evaluation)  FOTO: 1/3    PTA Visit #: 0/5     Progress Note Due: 8/27/2022    Time In: 1508  Time Out: 1646  Total Billable Time: 38 minutes (denotes billable time)    Precautions: Standard and Anterior apprach s/p total hip replacement right 5/5/2022 (NO EXTENSION, EXTERNAL ROTATION, OR ABDUCTION)     SUBJECTIVE     Pt reports: she is no longer feeling dizzy. Patient reports to therapy without cane. Patient feels that she is almost perfectly recovered which she tests by her ability to lift her right lower extremity to get into the car.     She was compliant with home exercise program, performing daily.    Response to previous treatment: felt good after her last session.    Functional change: Able to don pants without assistance from her upper extremities to lift her right leg into her pants, not walking with her straight cane. More stable with walking on unlevel rock surface in her back yard without her cane.     Pain: 0/10  Location: right hip (no pain today)    OBJECTIVE     Objective Measures updated at progress report unless specified.     Treatment     Katya received therapeutic exercises to develop strength, endurance, ROM, flexibility, posture and core stabilization for (23) minutes including:     Intervention Performed Today    NuStep  5 minutes level 5 for strength/endurance   Exercise bike x  5 minutes level 1   Heel Slides   4 x 10 bilateral     Hip Adduction  5 second hold ball between knees x 30     Hip Abduction with Band in Supine x blue theraband unilateral 1 minutes B, 1 minute in unison   LE Marches Sitting (added weight today) x 3x10 reps B 3 pounds   Re-Assessed Objective Measurements     Hip Abduction/Adduction in Supine Small Range with PT Assist   Right x10 reps   Quad sets  3 minutes   Glut sets  3 minutes   Straight leg raise   2 x 10 Right, 3x10  Left   Bridges  x 4  X 10    Long arch quad  x 3 x 10 bilateral 2 pounds (increased)   Hip flexion x 3 x 10 blue theraband      Plan for Next Visit: Continue to progress as tolerated and per surgical precautions     Katya participated in neuromuscular re-education activities to improve: Balance, Coordination, Kinesthetic, Proprioception and Posture for (15) minutes. The following activities were included:    Intervention Performed Today    Sit to Stands x x30 reps straight through   Step Taps (increased height)  6 inches x30 reps   Step Ups (increased height)  6 inches 2x10 reps    Heel Raises  3x10 reps    Hurdles- Hip Flexion (added) X  x x20 reps stepping over short lloyd B  x10 reps tapping top of tall lloyd B                    Plan for Next Visit: Continue to progress functional strength     Katya received the following manual therapy techniques: Myofacial release, Soft tissue Mobilization and Scar Mobilizations were applied to the: right scar mobilizations and right upper leg musculature for (0) minutes, including:    Manual Intervention Performed Today    Soft Tissue Mobilization  right scar mobilizations and adductors    Joint Mobilizations     Mobilization with movement          Functional Dry Needling        Plan for Next Visit: Continue as needed      Patient Education and Home Exercises     Home Exercises Provided and Patient Education Provided     Education provided:   - Educated patient on the proper form and sequencing of all  exercises performed today including going back over her HEP exercises as she had questions on repetitions and form as well as modifications   - Educated patient on proper way to perform scar mobilizations at home on her own to assist in decreasing adhesions   6/24/2022: reviewed hip precautions with patient. Advised to avoid wearing flip-flop style shoes for added safety and suggested to patient to wear a closed heel shoe as opposed to ambulating bare-footed.   6/27/2022: reviewed hip precautions with patient and educated to not cross her lower extremities to avoid external rotation/hip abduction    Written Home Exercises Provided: Patient instructed to cont prior HEP. Exercises were reviewed and Katya was able to demonstrate them prior to the end of the session.  Katya demonstrated good  understanding of the education provided. See EMR under Patient Instructions for exercises provided during therapy sessions      ASSESSMENT     Patient tolerated treatment well today. Scar adhesions were a little more prominent today as the patient has not really been working on this at home as much, emphasized importance. Patient noted with better endurance with sit to stands today, with no rest breaks required between repetitions. Incorporated lloyd step overs and taps to improve her strength and ability to get in and out of her car with less difficulty and dysfunction. Patient noted with much more difficulty and fatigue on the right as compared to the left. Educated patient how she can mimick this at home so she can continue to practice this to allow for more ease with functional activities.     Katya Is progressing well towards her goals.   Pt prognosis is Good.     Pt will continue to benefit from skilled outpatient physical therapy to address the deficits listed in the problem list box on initial evaluation, provide pt/family education and to maximize pt's level of independence in the home and community environment.     Pt's  spiritual, cultural and educational needs considered and pt agreeable to plan of care and goals.     Anticipated barriers to physical therapy: chronicity of condition (hip pain)    Goals:   Short Term Goals:  6 weeks 7/27/2022   1. Pain: Patient will demonstrate improved pain by reports of less than or equal to 4/10 worst pain on the verbal rating scale in order to progress toward maximal functional ability and improve QOL.  MET 6/30/2022   2. Function: Patient will demonstrate improved function as indicated by a score of less than or equal to 50% impairment on FOTO.   Met   3. Mobility: Patient will improve AROM to 75% of stated goals, listed in objective measures above, in order to progress towards independence with functional activities.   Partially Met   4. Strength: Patient will improve strength to 4-/5 in all muscle groups, listed in objective measures above, in order to progress towards independence with functional activities.   Progressing   5. Gait: Patient will demonstrate improved gait mechanics including more functional hip and knee flexion and extension in order to improve functional mobility, improve quality of life, and decrease risk of further injury or fall.   Progressing   6. HEP: Patient will demonstrate independence with HEP in order to progress toward functional independence.  Met      Long Term Goals:  12 weeks 7/27/2022   1. Pain: Patient will demonstrate improved pain by reports of less than or equal to 1/10 worst pain on the verbal rating scale in order to progress toward maximal functional ability and improve QOL.    Progressing   2. Function: Patient will demonstrate improved function as indicated by a score of less than or equal to 39% impairment on FOTO.   Progressing   3. Mobility: Patient will improve AROM to stated goals, listed in objective measures above, in order to return to maximal functional potential and improve quality of life.  Progressing   4. Strength: Patient will improve  strength to stated goals, listed in objective measures above, in order to improve functional independence and quality of life.  Progressing   5. Gait: Patient will demonstrate normalized gait mechanics with minimal compensation in order to return to PLOF.  Progressing   6. Patient will return to normal ADL's, IADL's, recreational activities, and work-related activities with less than or equal to 1/10 pain and maximal function.   Progressing      PLAN     Continue POC and frequency as planned. Continue to progress range of motion and strengthening to tolerance      These services are reasonable and necessary for the conditions set forth above while under my care.    Leatha Jurado, PT, DPT

## 2022-08-25 ENCOUNTER — DOCUMENTATION ONLY (OUTPATIENT)
Dept: REHABILITATION | Facility: HOSPITAL | Age: 87
End: 2022-08-25
Payer: MEDICARE

## 2022-08-25 NOTE — PROGRESS NOTES
Patient arrived to therapy 45 minutes after original appointment time as she had forgotten that she had an appointment today. Upon arrival she reported that she was very tired and winded in which her oxygen saturation was 92% and her blood pressure was 146/80. Patient's oxygen slowly returned to 98% after sitting but reported that her blood pressure is normally much lower than that. Patient reported that she really did not feel up for therapy today and wished to cancel her appointment. Walked the patient to her car to assure safety upon exiting building.     Leatha Jurado, PT, DPT

## 2022-08-30 ENCOUNTER — CLINICAL SUPPORT (OUTPATIENT)
Dept: REHABILITATION | Facility: HOSPITAL | Age: 87
End: 2022-08-30
Payer: MEDICARE

## 2022-08-30 DIAGNOSIS — R26.89 FUNCTIONAL GAIT ABNORMALITY: ICD-10-CM

## 2022-08-30 DIAGNOSIS — R53.1 DECREASED STRENGTH, ENDURANCE, AND MOBILITY: Primary | ICD-10-CM

## 2022-08-30 DIAGNOSIS — R68.89 DECREASED STRENGTH, ENDURANCE, AND MOBILITY: Primary | ICD-10-CM

## 2022-08-30 DIAGNOSIS — Z74.09 DECREASED STRENGTH, ENDURANCE, AND MOBILITY: Primary | ICD-10-CM

## 2022-08-30 PROCEDURE — 97112 NEUROMUSCULAR REEDUCATION: CPT

## 2022-08-30 PROCEDURE — 97110 THERAPEUTIC EXERCISES: CPT

## 2022-08-30 NOTE — PROGRESS NOTES
[facility]   Physical Therapy Treatment Note    Name: Katya Dorantes  Clinic Number: [id number]    Therapy Diagnosis:   Encounter Diagnoses   Name Primary?    Decreased strength, endurance, and mobility Yes    Functional gait abnormality      Physician: Cain New MD    Visit Date: 8/30/2022    Physician Orders: PT Eval and Treat  Medical Diagnosis from Referral: Status post total hip replacement right  Evaluation Date: 6/13/2022  Authorization Period Expiration: 12/31/2022  Plan of Care Expiration: 9/13/2022  Progress Note Due: 8/27/2022  Visit # / Visits authorized: 14/20 (+1 for evaluation)  FOTO: 1/3    PTA Visit #: 0/5     Progress Note Due: 8/27/2022    Time In: 1418  Time Out: 1500  Total Billable Time: 38 minutes (denotes billable time)    Precautions: Standard and Anterior apprach s/p total hip replacement right 5/5/2022 (NO EXTENSION, EXTERNAL ROTATION, OR ABDUCTION)     SUBJECTIVE     Pt reports: that today is the first day that she is finally feeling back to normal. Patient reports that she was able to spend 2 hours in the pool and parked far from the grocery store to require her to walk more. Patient later reported that she might have overdone it today.    She was compliant with home exercise program, performing daily.    Response to previous treatment: felt good after her last session.    Functional change: Able to don pants without assistance from her upper extremities to lift her right leg into her pants, not walking with her straight cane. More stable with walking on unlevel rock surface in her back yard without her cane.     Pain: 0/10  Location: right hip (no pain today)    OBJECTIVE     Objective Measures updated at progress report unless specified.     Treatment     Katya received therapeutic exercises to develop strength, endurance, ROM, flexibility, posture and core stabilization for (23) minutes including:     Intervention Performed Today    NuStep  5 minutes  level 5 for strength/endurance   Exercise bike x 5 minutes level 1   Heel Slides   4 x 10 bilateral     Hip Adduction  5 second hold ball between knees x 30     Hip Abduction with Band in Supine  blue theraband unilateral 1 minutes B, 1 minute in unison   LE Marches Sitting (added weight today) x 3x10 reps B 4 pounds   Re-Assessed Objective Measurements     Hip Abduction/Adduction in Supine Small Range with PT Assist   Right x10 reps   Quad sets  3 minutes   Glut sets  3 minutes   Straight leg raise  x 2 x 10 Right, x20  Left   Bridges   4  X 10    Long arch quad  x 2 x 10 bilateral 4 pounds (increased)   Hip flexion  3 x 10 blue theraband      Plan for Next Visit: Continue to progress as tolerated and per surgical precautions     Katya participated in neuromuscular re-education activities to improve: Balance, Coordination, Kinesthetic, Proprioception and Posture for (15) minutes. The following activities were included:    Intervention Performed Today    Sit to Stands x x30 reps straight through   Step Taps (increased height)  6 inches x30 reps   Step Ups (increased height)  6 inches 2x10 reps    Heel Raises  3x10 reps    Hurdles- Hip Flexion (added) x  X    x x20 reps stepping over short lloyd R forward, x10 reps stepping over short lloyd R lateral (added)  x10 reps tapping top of tall lloyd B                    Plan for Next Visit: Continue to progress functional strength     Katya received the following manual therapy techniques: Myofacial release, Soft tissue Mobilization and Scar Mobilizations were applied to the: right scar mobilizations and right upper leg musculature for (0) minutes, including:    Manual Intervention Performed Today    Soft Tissue Mobilization  right scar mobilizations and adductors    Joint Mobilizations     Mobilization with movement          Functional Dry Needling        Plan for Next Visit: Continue as needed      Patient Education and Home Exercises     Home Exercises Provided and  Patient Education Provided     Education provided:   - Educated patient on the proper form and sequencing of all exercises performed today including going back over her HEP exercises as she had questions on repetitions and form as well as modifications   - Educated patient on proper way to perform scar mobilizations at home on her own to assist in decreasing adhesions   6/24/2022: reviewed hip precautions with patient. Advised to avoid wearing flip-flop style shoes for added safety and suggested to patient to wear a closed heel shoe as opposed to ambulating bare-footed.   6/27/2022: reviewed hip precautions with patient and educated to not cross her lower extremities to avoid external rotation/hip abduction    Written Home Exercises Provided: Patient instructed to cont prior HEP. Exercises were reviewed and Katya was able to demonstrate them prior to the end of the session.  Katya demonstrated good  understanding of the education provided. See EMR under Patient Instructions for exercises provided during therapy sessions      ASSESSMENT     Patient tolerated treatment well today. Scar adhesion less prominent today so deferred manual therapy interventions to focus on strengthening. Progressed hip flexor strengthening to incorporate additional weight and progressing endurance by performing more repetitions with less rest breaks. Patient was more winded today so did allow for rest breaks for oxygenation to return to at least 98% before continuing with exercise. Overall patient noted with better foot clearance due to better hip flexion in standing with step overs today. Plan to discharge next visit to allow her to continue with strengthening program at home.    Katya Is progressing well towards her goals.   Pt prognosis is Good.     Pt will continue to benefit from skilled outpatient physical therapy to address the deficits listed in the problem list box on initial evaluation, provide pt/family education and to maximize  pt's level of independence in the home and community environment.     Pt's spiritual, cultural and educational needs considered and pt agreeable to plan of care and goals.     Anticipated barriers to physical therapy: chronicity of condition (hip pain)    Goals:   Short Term Goals:  6 weeks 7/27/2022   Pain: Patient will demonstrate improved pain by reports of less than or equal to 4/10 worst pain on the verbal rating scale in order to progress toward maximal functional ability and improve QOL.  MET 6/30/2022   Function: Patient will demonstrate improved function as indicated by a score of less than or equal to 50% impairment on FOTO.   Met   Mobility: Patient will improve AROM to 75% of stated goals, listed in objective measures above, in order to progress towards independence with functional activities.   Partially Met   Strength: Patient will improve strength to 4-/5 in all muscle groups, listed in objective measures above, in order to progress towards independence with functional activities.   Progressing   Gait: Patient will demonstrate improved gait mechanics including more functional hip and knee flexion and extension in order to improve functional mobility, improve quality of life, and decrease risk of further injury or fall.   Progressing   HEP: Patient will demonstrate independence with HEP in order to progress toward functional independence.  Met      Long Term Goals:  12 weeks 7/27/2022   Pain: Patient will demonstrate improved pain by reports of less than or equal to 1/10 worst pain on the verbal rating scale in order to progress toward maximal functional ability and improve QOL.    Progressing   Function: Patient will demonstrate improved function as indicated by a score of less than or equal to 39% impairment on FOTO.   Progressing   Mobility: Patient will improve AROM to stated goals, listed in objective measures above, in order to return to maximal functional potential and improve quality of life.   Progressing   Strength: Patient will improve strength to stated goals, listed in objective measures above, in order to improve functional independence and quality of life.  Progressing   Gait: Patient will demonstrate normalized gait mechanics with minimal compensation in order to return to PLOF.  Progressing   Patient will return to normal ADL's, IADL's, recreational activities, and work-related activities with less than or equal to 1/10 pain and maximal function.   Progressing      PLAN     Continue POC and frequency as planned. Continue to progress range of motion and strengthening to tolerance      These services are reasonable and necessary for the conditions set forth above while under my care.    Leatha Jurado, PT, DPT

## 2022-09-01 ENCOUNTER — CLINICAL SUPPORT (OUTPATIENT)
Dept: REHABILITATION | Facility: HOSPITAL | Age: 87
End: 2022-09-01
Payer: MEDICARE

## 2022-09-01 DIAGNOSIS — R53.1 DECREASED STRENGTH, ENDURANCE, AND MOBILITY: Primary | ICD-10-CM

## 2022-09-01 DIAGNOSIS — R68.89 DECREASED STRENGTH, ENDURANCE, AND MOBILITY: Primary | ICD-10-CM

## 2022-09-01 DIAGNOSIS — Z74.09 DECREASED STRENGTH, ENDURANCE, AND MOBILITY: Primary | ICD-10-CM

## 2022-09-01 DIAGNOSIS — R26.89 FUNCTIONAL GAIT ABNORMALITY: ICD-10-CM

## 2022-09-01 PROCEDURE — 97110 THERAPEUTIC EXERCISES: CPT

## 2022-09-01 NOTE — PROGRESS NOTES
[facility]   Physical Therapy Treatment Note    Name: Katya Dorantes  Clinic Number: [id number]    Therapy Diagnosis:   Encounter Diagnoses   Name Primary?    Decreased strength, endurance, and mobility Yes    Functional gait abnormality      Physician: Cain New MD    Visit Date: 9/1/2022    Physician Orders: PT Eval and Treat  Medical Diagnosis from Referral: Status post total hip replacement right  Evaluation Date: 6/13/2022  Authorization Period Expiration: 12/31/2022  Plan of Care Expiration: 9/13/2022  Visit # / Visits authorized: 15/20 (+1 for evaluation)  FOTO: 1/3    PTA Visit #: 0/5     Discharge today     Time In: 1331  Time Out: 1355  Total Billable Time: 24 minutes (denotes billable time)    Precautions: Standard and Anterior apprach s/p total hip replacement right 5/5/2022 (NO EXTENSION, EXTERNAL ROTATION, OR ABDUCTION)     SUBJECTIVE     Pt reports: that she is feeling pretty good today. Patient reports that she has been working in the yard and got in and out of the car really well today.     She was compliant with home exercise program, performing daily.    Response to previous treatment: felt good after her last session.    Functional change: Able to don pants without assistance from her upper extremities to lift her right leg into her pants, not walking with her straight cane. More stable with walking on unlevel rock surface in her back yard without her cane.     Pain: 0/10  Location: right hip (no pain today)    OBJECTIVE     Objective Measures updated at progress report unless specified.      (x = not tested due to pain and/or inability to obtain test position)     Range of Motion/Strength:      Hip Right  9/1/2022 Left  6/13/2022 Pain/Dysfunction with Movement Goal   AROM           Flexion (120)  120  120 Mild discomfort 120 No pain   Extension (30)  0  0 No pain 15 No pain   Abduction (45)  30  40 Mild discomfort 40 No pain   IR (45)  35  40 No pain 40 No pain    ER (45)  35  40 No pain 40 No pain      Knee Right  7/27/2022 Left  6/13/2022 Pain/Dysfunction with Movement Goal   AROM       AROM   Flexion (135)  135  145 No pain 145 No pain   Extension (0)  0  0 No  pain -      Assessed strength in modified seated position   L/E MMT Right Left Pain/Dysfunction with Movement Goal   Hip Flexion  4/5 4+/5 No pain 4+/5 B   Hip Extension  x x  No pain 4+/5 B   Hip Abduction  3+/5 3+/5 No pain 4+/5 B   Hip Adduction 3+/5 3+/5 Mild discomfort 4+/5 B   Knee Extension 4+/5 4+/5 No pain 5/5 B   Knee Flexion 4-/5 4-/5 No pain 5/5 B   Hip IR 4/5 4/5 No pain 4+/5 B   Hip ER 4/5 4/5 No pain 4+/5 B   Ankle DF 4+/5 4+/5  No pain 5/5 B   Ankle PF 4/5 4/5  No pain 5/5 B      30 Second Sit to Stand Test: 16 repetitions without use of upper extremities         Treatment     Katya received therapeutic exercises to develop strength, endurance, ROM, flexibility, posture and core stabilization for (24) minutes including:     Intervention Performed Today    NuStep  5 minutes level 5 for strength/endurance   Exercise bike x 5 minutes level 1   Heel Slides   4 x 10 bilateral     Hip Adduction  5 second hold ball between knees x 30     Hip Abduction with Band in Supine  blue theraband unilateral 1 minutes B, 1 minute in unison   LE Marches Sitting (added weight today)  3x10 reps B 4 pounds   Re-Assessed Objective Measurements x    Hip Abduction/Adduction in Supine Small Range with PT Assist   Right x10 reps   Quad sets  3 minutes   Glut sets  3 minutes   Straight leg raise   2 x 10 Right, x20  Left   Bridges   4  X 10    Long arch quad   2 x 10 bilateral 4 pounds (increased)   Hip flexion  3 x 10 blue theraband      Plan for Next Visit: Continue to progress as tolerated and per surgical precautions     Katya participated in neuromuscular re-education activities to improve: Balance, Coordination, Kinesthetic, Proprioception and Posture for (0) minutes. The following activities were  included:    Intervention Performed Today    Sit to Stands x x30 reps straight through   Step Taps (increased height)  6 inches x30 reps   Step Ups (increased height)  6 inches 2x10 reps    Heel Raises  3x10 reps    Hurdles- Hip Flexion (added) x  X    x x20 reps stepping over short lloyd R forward, x10 reps stepping over short lloyd R lateral (added)  x10 reps tapping top of tall lloyd B                    Plan for Next Visit: Continue to progress functional strength     Katya received the following manual therapy techniques: Myofacial release, Soft tissue Mobilization and Scar Mobilizations were applied to the: right scar mobilizations and right upper leg musculature for (0) minutes, including:    Manual Intervention Performed Today    Soft Tissue Mobilization  right scar mobilizations and adductors    Joint Mobilizations     Mobilization with movement          Functional Dry Needling        Plan for Next Visit: Continue as needed      Patient Education and Home Exercises     Home Exercises Provided and Patient Education Provided     Education provided:   - Educated patient on the proper form and sequencing of all exercises performed today including going back over her HEP exercises as she had questions on repetitions and form as well as modifications   - Educated patient on proper way to perform scar mobilizations at home on her own to assist in decreasing adhesions   6/24/2022: reviewed hip precautions with patient. Advised to avoid wearing flip-flop style shoes for added safety and suggested to patient to wear a closed heel shoe as opposed to ambulating bare-footed.   6/27/2022: reviewed hip precautions with patient and educated to not cross her lower extremities to avoid external rotation/hip abduction    Written Home Exercises Provided: Patient instructed to cont prior HEP. Exercises were reviewed and Katya was able to demonstrate them prior to the end of the session.  Katya demonstrated good   understanding of the education provided. See EMR under Patient Instructions for exercises provided during therapy sessions      ASSESSMENT     Patient tolerated treatment well today. Patient noted with improved strength throughout bilateral lower extremities with very minimal discomfort. Patient scored considerably higher on 30 Second Sit to Stand removing her from a fall risk category noting functional improvements as well. Patient has reached projected FOTO score as well. Patient declined need for updated HEP as she remembers all of her exercises and does them daily.     Katya Is progressing well towards her goals.   Pt prognosis is Good.     Pt will continue to benefit from skilled outpatient physical therapy to address the deficits listed in the problem list box on initial evaluation, provide pt/family education and to maximize pt's level of independence in the home and community environment.     Pt's spiritual, cultural and educational needs considered and pt agreeable to plan of care and goals.     Anticipated barriers to physical therapy: chronicity of condition (hip pain)    Goals:   Short Term Goals:  6 weeks 9/1/2022   Pain: Patient will demonstrate improved pain by reports of less than or equal to 4/10 worst pain on the verbal rating scale in order to progress toward maximal functional ability and improve QOL.  MET 6/30/2022   Function: Patient will demonstrate improved function as indicated by a score of less than or equal to 50% impairment on FOTO.   Met   Mobility: Patient will improve AROM to 75% of stated goals, listed in objective measures above, in order to progress towards independence with functional activities.   Met   Strength: Patient will improve strength to 4-/5 in all muscle groups, listed in objective measures above, in order to progress towards independence with functional activities.   Partially Met   Gait: Patient will demonstrate improved gait mechanics including more functional hip  and knee flexion and extension in order to improve functional mobility, improve quality of life, and decrease risk of further injury or fall.   Met   HEP: Patient will demonstrate independence with HEP in order to progress toward functional independence.  Met      Long Term Goals:  12 weeks 9/1/2022   Pain: Patient will demonstrate improved pain by reports of less than or equal to 1/10 worst pain on the verbal rating scale in order to progress toward maximal functional ability and improve QOL.    Met   Function: Patient will demonstrate improved function as indicated by a score of less than or equal to 39% impairment on FOTO.   Met   Mobility: Patient will improve AROM to stated goals, listed in objective measures above, in order to return to maximal functional potential and improve quality of life.  Partially Met   Strength: Patient will improve strength to stated goals, listed in objective measures above, in order to improve functional independence and quality of life.  Partially Met   Gait: Patient will demonstrate normalized gait mechanics with minimal compensation in order to return to PLOF.  Met   Patient will return to normal ADL's, IADL's, recreational activities, and work-related activities with less than or equal to 1/10 pain and maximal function.   Met      PLAN   Discharged today as the patient feels back to normal and would like to continue with strengthening program on her own.      These services are reasonable and necessary for the conditions set forth above while under my care.    Leatha Jurado, PT, DPT

## 2025-06-25 NOTE — TELEPHONE ENCOUNTER
Spoke with pt.  Her vision was very blurry this am.  It improved when she used Diamox, but she is on her last tablet.  Per Dr. Sanches, pt to come in today for appt.   cardiac MRI 5/28/2025